# Patient Record
Sex: FEMALE | Race: WHITE | NOT HISPANIC OR LATINO | Employment: OTHER | URBAN - METROPOLITAN AREA
[De-identification: names, ages, dates, MRNs, and addresses within clinical notes are randomized per-mention and may not be internally consistent; named-entity substitution may affect disease eponyms.]

---

## 2017-02-16 ENCOUNTER — HOSPITAL ENCOUNTER (INPATIENT)
Facility: HOSPITAL | Age: 82
LOS: 7 days | Discharge: NURSING, REHAB OR LONG-TERM CARE | End: 2017-02-23
Attending: EMERGENCY MEDICINE | Admitting: FAMILY MEDICINE

## 2017-02-16 ENCOUNTER — APPOINTMENT (OUTPATIENT)
Dept: GENERAL RADIOLOGY | Facility: HOSPITAL | Age: 82
End: 2017-02-16

## 2017-02-16 DIAGNOSIS — N39.0 URINARY TRACT INFECTION, SITE UNSPECIFIED: ICD-10-CM

## 2017-02-16 DIAGNOSIS — R65.20 SEVERE SEPSIS (HCC): Primary | ICD-10-CM

## 2017-02-16 DIAGNOSIS — L03.119 CELLULITIS OF LOWER EXTREMITY, UNSPECIFIED LATERALITY: ICD-10-CM

## 2017-02-16 DIAGNOSIS — A41.9 SEVERE SEPSIS (HCC): Primary | ICD-10-CM

## 2017-02-16 LAB
ALBUMIN SERPL-MCNC: 3.6 G/DL (ref 3.2–4.8)
ALBUMIN/GLOB SERPL: 1.1 G/DL (ref 1.5–2.5)
ALP SERPL-CCNC: 76 U/L (ref 25–100)
ALT SERPL W P-5'-P-CCNC: 10 U/L (ref 7–40)
ANION GAP SERPL CALCULATED.3IONS-SCNC: 8 MMOL/L (ref 3–11)
AST SERPL-CCNC: 17 U/L (ref 0–33)
BACTERIA UR QL AUTO: ABNORMAL /HPF
BASOPHILS # BLD AUTO: 0.01 10*3/MM3 (ref 0–0.2)
BASOPHILS NFR BLD AUTO: 0.1 % (ref 0–1)
BILIRUB SERPL-MCNC: 0.4 MG/DL (ref 0.3–1.2)
BILIRUB UR QL STRIP: NEGATIVE
BUN BLD-MCNC: 33 MG/DL (ref 9–23)
BUN/CREAT SERPL: 33 (ref 7–25)
C DIFF TOX GENS STL QL NAA+PROBE: DETECTED
CALCIUM SPEC-SCNC: 9.3 MG/DL (ref 8.7–10.4)
CHLORIDE SERPL-SCNC: 105 MMOL/L (ref 99–109)
CLARITY UR: ABNORMAL
CO2 SERPL-SCNC: 25 MMOL/L (ref 20–31)
COLOR UR: ABNORMAL
CREAT BLD-MCNC: 1 MG/DL (ref 0.6–1.3)
D-LACTATE SERPL-SCNC: 1.4 MMOL/L (ref 0.5–2)
D-LACTATE SERPL-SCNC: 3.2 MMOL/L (ref 0.5–2)
D-LACTATE SERPL-SCNC: 4.2 MMOL/L (ref 0.5–2)
D-LACTATE SERPL-SCNC: 4.6 MMOL/L (ref 0.5–2)
DEPRECATED RDW RBC AUTO: 51.9 FL (ref 37–54)
EOSINOPHIL # BLD AUTO: 0.2 10*3/MM3 (ref 0.1–0.3)
EOSINOPHIL NFR BLD AUTO: 1.5 % (ref 0–3)
ERYTHROCYTE [DISTWIDTH] IN BLOOD BY AUTOMATED COUNT: 15.1 % (ref 11.3–14.5)
GFR SERPL CREATININE-BSD FRML MDRD: 52 ML/MIN/1.73
GLOBULIN UR ELPH-MCNC: 3.4 GM/DL
GLUCOSE BLD-MCNC: 101 MG/DL (ref 70–100)
GLUCOSE BLDC GLUCOMTR-MCNC: 90 MG/DL (ref 70–130)
GLUCOSE UR STRIP-MCNC: NEGATIVE MG/DL
HCT VFR BLD AUTO: 30.7 % (ref 34.5–44)
HGB BLD-MCNC: 9.7 G/DL (ref 11.5–15.5)
HGB UR QL STRIP.AUTO: ABNORMAL
HYALINE CASTS UR QL AUTO: ABNORMAL /LPF
IMM GRANULOCYTES # BLD: 0.09 10*3/MM3 (ref 0–0.03)
IMM GRANULOCYTES NFR BLD: 0.7 % (ref 0–0.6)
KETONES UR QL STRIP: NEGATIVE
LEUKOCYTE ESTERASE UR QL STRIP.AUTO: ABNORMAL
LYMPHOCYTES # BLD AUTO: 0.17 10*3/MM3 (ref 0.6–4.8)
LYMPHOCYTES NFR BLD AUTO: 1.2 % (ref 24–44)
MCH RBC QN AUTO: 29.7 PG (ref 27–31)
MCHC RBC AUTO-ENTMCNC: 31.6 G/DL (ref 32–36)
MCV RBC AUTO: 93.9 FL (ref 80–99)
MONOCYTES # BLD AUTO: 0.64 10*3/MM3 (ref 0–1)
MONOCYTES NFR BLD AUTO: 4.7 % (ref 0–12)
NEUTROPHILS # BLD AUTO: 12.55 10*3/MM3 (ref 1.5–8.3)
NEUTROPHILS NFR BLD AUTO: 91.8 % (ref 41–71)
NITRITE UR QL STRIP: POSITIVE
PH UR STRIP.AUTO: 8.5 [PH] (ref 5–8)
PLAT MORPH BLD: NORMAL
PLATELET # BLD AUTO: 387 10*3/MM3 (ref 150–450)
PMV BLD AUTO: 10.6 FL (ref 6–12)
POTASSIUM BLD-SCNC: 4.1 MMOL/L (ref 3.5–5.5)
PROT SERPL-MCNC: 7 G/DL (ref 5.7–8.2)
PROT UR QL STRIP: ABNORMAL
RBC # BLD AUTO: 3.27 10*6/MM3 (ref 3.89–5.14)
RBC # UR: ABNORMAL /HPF
REF LAB TEST METHOD: ABNORMAL
SODIUM BLD-SCNC: 138 MMOL/L (ref 132–146)
SP GR UR STRIP: 1.02 (ref 1–1.03)
SQUAMOUS #/AREA URNS HPF: ABNORMAL /HPF
STOMATOCYTES BLD QL SMEAR: NORMAL
UROBILINOGEN UR QL STRIP: ABNORMAL
WBC MORPH BLD: NORMAL
WBC NRBC COR # BLD: 13.66 10*3/MM3 (ref 3.5–10.8)
WBC UR QL AUTO: ABNORMAL /HPF

## 2017-02-16 PROCEDURE — 83605 ASSAY OF LACTIC ACID: CPT | Performed by: FAMILY MEDICINE

## 2017-02-16 PROCEDURE — 80053 COMPREHEN METABOLIC PANEL: CPT | Performed by: EMERGENCY MEDICINE

## 2017-02-16 PROCEDURE — 99285 EMERGENCY DEPT VISIT HI MDM: CPT

## 2017-02-16 PROCEDURE — 94799 UNLISTED PULMONARY SVC/PX: CPT

## 2017-02-16 PROCEDURE — 87147 CULTURE TYPE IMMUNOLOGIC: CPT | Performed by: EMERGENCY MEDICINE

## 2017-02-16 PROCEDURE — 87086 URINE CULTURE/COLONY COUNT: CPT | Performed by: EMERGENCY MEDICINE

## 2017-02-16 PROCEDURE — 87493 C DIFF AMPLIFIED PROBE: CPT | Performed by: FAMILY MEDICINE

## 2017-02-16 PROCEDURE — 83605 ASSAY OF LACTIC ACID: CPT | Performed by: EMERGENCY MEDICINE

## 2017-02-16 PROCEDURE — 85025 COMPLETE CBC W/AUTO DIFF WBC: CPT | Performed by: EMERGENCY MEDICINE

## 2017-02-16 PROCEDURE — 71010 HC CHEST PA OR AP: CPT

## 2017-02-16 PROCEDURE — 87150 DNA/RNA AMPLIFIED PROBE: CPT | Performed by: EMERGENCY MEDICINE

## 2017-02-16 PROCEDURE — 94640 AIRWAY INHALATION TREATMENT: CPT

## 2017-02-16 PROCEDURE — 85007 BL SMEAR W/DIFF WBC COUNT: CPT | Performed by: EMERGENCY MEDICINE

## 2017-02-16 PROCEDURE — 87077 CULTURE AEROBIC IDENTIFY: CPT | Performed by: EMERGENCY MEDICINE

## 2017-02-16 PROCEDURE — 25010000002 VANCOMYCIN PER 500 MG: Performed by: EMERGENCY MEDICINE

## 2017-02-16 PROCEDURE — 81001 URINALYSIS AUTO W/SCOPE: CPT | Performed by: EMERGENCY MEDICINE

## 2017-02-16 PROCEDURE — 87186 SC STD MICRODIL/AGAR DIL: CPT | Performed by: EMERGENCY MEDICINE

## 2017-02-16 PROCEDURE — 94760 N-INVAS EAR/PLS OXIMETRY 1: CPT

## 2017-02-16 PROCEDURE — 25010000002 PIPERACILLIN SOD-TAZOBACTAM PER 1 G: Performed by: EMERGENCY MEDICINE

## 2017-02-16 PROCEDURE — 87040 BLOOD CULTURE FOR BACTERIA: CPT | Performed by: EMERGENCY MEDICINE

## 2017-02-16 PROCEDURE — 25010000002 PIPERACILLIN SOD-TAZOBACTAM PER 1 G: Performed by: FAMILY MEDICINE

## 2017-02-16 PROCEDURE — 99223 1ST HOSP IP/OBS HIGH 75: CPT | Performed by: FAMILY MEDICINE

## 2017-02-16 PROCEDURE — 82962 GLUCOSE BLOOD TEST: CPT

## 2017-02-16 RX ORDER — SULFAMETHOXAZOLE AND TRIMETHOPRIM 800; 160 MG/1; MG/1
1 TABLET ORAL 2 TIMES DAILY
COMMUNITY
Start: 2017-02-14 | End: 2017-02-23 | Stop reason: HOSPADM

## 2017-02-16 RX ORDER — TRAMADOL HYDROCHLORIDE 50 MG/1
50 TABLET ORAL EVERY 8 HOURS PRN
Status: DISCONTINUED | OUTPATIENT
Start: 2017-02-16 | End: 2017-02-23 | Stop reason: HOSPADM

## 2017-02-16 RX ORDER — IPRATROPIUM BROMIDE AND ALBUTEROL SULFATE 2.5; .5 MG/3ML; MG/3ML
3 SOLUTION RESPIRATORY (INHALATION)
Status: DISCONTINUED | OUTPATIENT
Start: 2017-02-16 | End: 2017-02-22

## 2017-02-16 RX ORDER — ONDANSETRON 2 MG/ML
4 INJECTION INTRAMUSCULAR; INTRAVENOUS EVERY 6 HOURS PRN
Status: DISCONTINUED | OUTPATIENT
Start: 2017-02-16 | End: 2017-02-23 | Stop reason: HOSPADM

## 2017-02-16 RX ORDER — VANCOMYCIN HYDROCHLORIDE 1 G/200ML
20 INJECTION, SOLUTION INTRAVENOUS ONCE
Status: COMPLETED | OUTPATIENT
Start: 2017-02-16 | End: 2017-02-16

## 2017-02-16 RX ORDER — SODIUM CHLORIDE 0.9 % (FLUSH) 0.9 %
1-10 SYRINGE (ML) INJECTION AS NEEDED
Status: DISCONTINUED | OUTPATIENT
Start: 2017-02-16 | End: 2017-02-23 | Stop reason: HOSPADM

## 2017-02-16 RX ORDER — ACETAMINOPHEN 650 MG/1
650 SUPPOSITORY RECTAL EVERY 4 HOURS PRN
Status: DISCONTINUED | OUTPATIENT
Start: 2017-02-16 | End: 2017-02-23 | Stop reason: HOSPADM

## 2017-02-16 RX ORDER — DORZOLAMIDE HYDROCHLORIDE AND TIMOLOL MALEATE 20; 5 MG/ML; MG/ML
1 SOLUTION/ DROPS OPHTHALMIC 2 TIMES DAILY
Status: DISCONTINUED | OUTPATIENT
Start: 2017-02-16 | End: 2017-02-23 | Stop reason: HOSPADM

## 2017-02-16 RX ORDER — ACETAMINOPHEN 500 MG
500 TABLET ORAL DAILY
Status: DISCONTINUED | OUTPATIENT
Start: 2017-02-16 | End: 2017-02-23 | Stop reason: HOSPADM

## 2017-02-16 RX ORDER — NYSTATIN 100000 [USP'U]/G
POWDER TOPICAL EVERY 12 HOURS SCHEDULED
Status: DISCONTINUED | OUTPATIENT
Start: 2017-02-16 | End: 2017-02-23 | Stop reason: HOSPADM

## 2017-02-16 RX ORDER — BISACODYL 10 MG
10 SUPPOSITORY, RECTAL RECTAL DAILY
Status: DISCONTINUED | OUTPATIENT
Start: 2017-02-16 | End: 2017-02-23 | Stop reason: HOSPADM

## 2017-02-16 RX ORDER — DOXYCYCLINE HYCLATE 100 MG/1
100 CAPSULE ORAL EVERY 12 HOURS SCHEDULED
Status: DISCONTINUED | OUTPATIENT
Start: 2017-02-16 | End: 2017-02-21

## 2017-02-16 RX ORDER — SODIUM CHLORIDE 9 MG/ML
125 INJECTION, SOLUTION INTRAVENOUS CONTINUOUS
Status: DISCONTINUED | OUTPATIENT
Start: 2017-02-16 | End: 2017-02-19

## 2017-02-16 RX ORDER — BISACODYL 10 MG
10 SUPPOSITORY, RECTAL RECTAL DAILY
COMMUNITY

## 2017-02-16 RX ORDER — SODIUM CHLORIDE 9 MG/ML
250 INJECTION, SOLUTION INTRAVENOUS CONTINUOUS
Status: DISCONTINUED | OUTPATIENT
Start: 2017-02-16 | End: 2017-02-16

## 2017-02-16 RX ORDER — MAGNESIUM HYDROXIDE/ALUMINUM HYDROXICE/SIMETHICONE 120; 1200; 1200 MG/30ML; MG/30ML; MG/30ML
30 SUSPENSION ORAL EVERY 6 HOURS PRN
Status: DISCONTINUED | OUTPATIENT
Start: 2017-02-16 | End: 2017-02-23 | Stop reason: HOSPADM

## 2017-02-16 RX ADMIN — TAZOBACTAM SODIUM AND PIPERACILLIN SODIUM 3.38 G: 375; 3 INJECTION, SOLUTION INTRAVENOUS at 08:24

## 2017-02-16 RX ADMIN — SODIUM CHLORIDE 125 ML/HR: 9 INJECTION, SOLUTION INTRAVENOUS at 14:52

## 2017-02-16 RX ADMIN — DORZOLAMIDE HYDROCHLORIDE AND TIMOLOL MALEATE 1 DROP: 20; 5 SOLUTION/ DROPS OPHTHALMIC at 20:12

## 2017-02-16 RX ADMIN — TAZOBACTAM SODIUM AND PIPERACILLIN SODIUM 3.38 G: 375; 3 INJECTION, SOLUTION INTRAVENOUS at 14:52

## 2017-02-16 RX ADMIN — TAZOBACTAM SODIUM AND PIPERACILLIN SODIUM 3.38 G: 375; 3 INJECTION, SOLUTION INTRAVENOUS at 20:13

## 2017-02-16 RX ADMIN — APIXABAN 2.5 MG: 2.5 TABLET, FILM COATED ORAL at 14:51

## 2017-02-16 RX ADMIN — NYSTATIN: 100000 POWDER TOPICAL at 20:14

## 2017-02-16 RX ADMIN — NYSTATIN: 100000 POWDER TOPICAL at 14:51

## 2017-02-16 RX ADMIN — APIXABAN 2.5 MG: 2.5 TABLET, FILM COATED ORAL at 20:12

## 2017-02-16 RX ADMIN — VANCOMYCIN HYDROCHLORIDE 1000 MG: 1 INJECTION, SOLUTION INTRAVENOUS at 09:02

## 2017-02-16 RX ADMIN — SODIUM CHLORIDE 500 ML: 9 INJECTION, SOLUTION INTRAVENOUS at 09:05

## 2017-02-16 RX ADMIN — TRAMADOL HYDROCHLORIDE 50 MG: 50 TABLET, COATED ORAL at 10:57

## 2017-02-16 RX ADMIN — IPRATROPIUM BROMIDE AND ALBUTEROL SULFATE 3 ML: .5; 3 SOLUTION RESPIRATORY (INHALATION) at 19:47

## 2017-02-16 RX ADMIN — DOXYCYCLINE HYCLATE 100 MG: 100 CAPSULE, GELATIN COATED ORAL at 20:12

## 2017-02-16 RX ADMIN — IPRATROPIUM BROMIDE AND ALBUTEROL SULFATE 3 ML: .5; 3 SOLUTION RESPIRATORY (INHALATION) at 15:45

## 2017-02-16 RX ADMIN — SODIUM CHLORIDE 1500 ML: 9 INJECTION, SOLUTION INTRAVENOUS at 10:18

## 2017-02-16 RX ADMIN — ACETAMINOPHEN 500 MG: 500 TABLET ORAL at 14:51

## 2017-02-16 RX ADMIN — SODIUM CHLORIDE 250 ML/HR: 9 INJECTION, SOLUTION INTRAVENOUS at 09:21

## 2017-02-16 NOTE — CONSULTS
Raghavendra Paz MD  Consulting physician: Gabino    Chief Complaint   Patient presents with   • Fever         HPI: Patient has a history of dementia and is unable to provide any type of history so history of present illness comes from chart review.  Patient apparently is a resident of a nursing facility in which she is mostly bed bound.  Patient has probably had multiple infections and been treated with multiple antibiotics over the last month to couple of months.  Patient comes in hospital for fever has been found to be septic at this point in time.      Past Medical History   Diagnosis Date   • Anemia    • Arthritis    • Chronic kidney disease (CKD), stage III (moderate)    • Chronic pain      right hip   • Confusion    • Dehydration    • Dementia    • Glaucoma    • H/O urinary frequency    • Hypertension    • Insomnia    • Macular degeneration    • PVD (peripheral vascular disease)      wears compression stockings   • Renal disorder    • Upper respiratory infection      Past Surgical History   Procedure Laterality Date   • Replacement total knee Left    • Bladder repair     • Hysterectomy       Current Facility-Administered Medications   Medication Dose Route Frequency Provider Last Rate Last Dose   • acetaminophen (TYLENOL) suppository 650 mg  650 mg Rectal Q4H PRN Nj Lloyd MD       • acetaminophen (TYLENOL) tablet 500 mg  500 mg Oral Daily Nj Lloyd MD       • aluminum-magnesium hydroxide-simethicone (MAALOX/MYLANTA) suspension 30 mL  30 mL Oral Q6H PRN Nj Lloyd MD       • apixaban (ELIQUIS) tablet 2.5 mg  2.5 mg Oral Q12H Nj Lloyd MD       • bisacodyl (DULCOLAX) suppository 10 mg  10 mg Rectal Daily Nj Lloyd MD       • dorzolamide-timolol (COSOPT) ophthalmic solution 1 drop  1 drop Both Eyes BID Nj Lloyd MD       • ipratropium-albuterol (DUO-NEB) nebulizer solution 3 mL  3 mL Nebulization 4x Daily - RT Nj Lloyd MD       • magnesium hydroxide (MILK OF MAGNESIA)  "suspension 2400 mg/10mL 10 mL  10 mL Oral Daily PRN Nj Lloyd MD       • nystatin (MYCOSTATIN) powder   Topical Q12H Nj Lloyd MD       • ondansetron (ZOFRAN) injection 4 mg  4 mg Intravenous Q6H PRN Nj Lloyd MD       • Pharmacy to dose vancomycin   Does not apply Continuous PRN Nj Lloyd MD       • piperacillin-tazobactam (ZOSYN) 3.375 g in dextrose 50 mL IVPB (premix)  3.375 g Intravenous Q6H Nj Lloyd MD       • sodium chloride 0.9 % flush 1-10 mL  1-10 mL Intravenous PRN Nj Lloyd MD       • sodium chloride 0.9 % infusion  125 mL/hr Intravenous Continuous Nj Lloyd MD       • traMADol (ULTRAM) tablet 50 mg  50 mg Oral Q8H PRN Nj Lloyd MD   50 mg at 02/16/17 1057   • [START ON 2/17/2017] vancomycin (dosing per levels)   Does not apply Daily Fady Comer, [facility]           Pharmacy to dose vancomycin    sodium chloride 125 mL/hr     •  acetaminophen  •  aluminum-magnesium hydroxide-simethicone  •  magnesium hydroxide  •  ondansetron  •  Pharmacy to dose vancomycin  •  sodium chloride  •  traMADol  No Known Allergies  Family History   Problem Relation Age of Onset   • Stroke Father      Social History     Social History   • Marital status: Unknown     Spouse name: N/A   • Number of children: N/A   • Years of education: N/A     Occupational History   • Not on file.     Social History Main Topics   • Smoking status: Never Smoker   • Smokeless tobacco: Not on file   • Alcohol use No   • Drug use: No   • Sexual activity: Defer     Other Topics Concern   • Not on file     Social History Narrative   • No narrative on file     Review of Systems - unable to obtain secondary to patient been unable answer questions      Visit Vitals   • /85   • Pulse (P) 81   • Temp 99.8 °F (37.7 °C) (Rectal)   • Resp 18   • Ht 62\" (157.5 cm)   • Wt 110 lb (49.9 kg)   • SpO2 92%   • BMI 20.12 kg/m2       Intake/Output Summary (Last 24 hours) at 02/16/17 1408  Last data filed at 02/16/17 0759   " Gross per 24 hour   Intake      0 ml   Output    400 ml   Net   -400 ml     Physical Exam:      General Appearance:    asleep, arousable, agitated appearing when aroused    Head:    Normocephalic, without obvious abnormality, atraumatic   Eyes:            Lids and lashes normal, conjunctivae and sclerae normal, no   icterus, no pallor, corneas clear, PERRLA   Ears:    Ears appear intact with no abnormalities noted   Throat:   No oral lesions, no thrush, oral mucosa moist   Neck:   No adenopathy, supple, trachea midline, no thyromegaly, no     carotid bruit, no JVD   Back:     No kyphosis present, no scoliosis present, no skin lesions,       erythema or scars, no tenderness to percussion or                   palpation,   range of motion normal   Lungs:     Clear to auscultation,respirations regular, even and                   unlabored    Heart:    Regular rhythm and normal rate, normal S1 and S2, no            murmur, no gallop, no rub, no click   Breast Exam:    Deferred   Abdomen:     Normal bowel sounds, no masses, no organomegaly, soft        non-tender, non-distended, no guarding, no rebound                 tenderness   Genitalia:    Deferred   Extremities:   left lower extremity with an area of erythema and warmth along the lower portion of her calf area going anteriorly    Pulses:   Pulses palpable and equal bilaterally   Skin:   patient noted to have heel ulcers unstageable on the right heel    Lymph nodes:   No palpable adenopathy             Results from last 7 days  Lab Units 02/16/17  0812   WBC 10*3/mm3 13.66*   HEMOGLOBIN g/dL 9.7*   HEMATOCRIT % 30.7*   PLATELETS 10*3/mm3 387       Results from last 7 days  Lab Units 02/16/17  0812   SODIUM mmol/L 138   POTASSIUM mmol/L 4.1   CHLORIDE mmol/L 105   TOTAL CO2 mmol/L 25.0   BUN mg/dL 33*   CREATININE mg/dL 1.00   CALCIUM mg/dL 9.3   BILIRUBIN mg/dL 0.4   ALK PHOS U/L 76   ALT (SGPT) U/L 10   AST (SGOT) U/L 17   GLUCOSE mg/dL 101*       Results from last  7 days  Lab Units 02/16/17  0812   SODIUM mmol/L 138   POTASSIUM mmol/L 4.1   CHLORIDE mmol/L 105   TOTAL CO2 mmol/L 25.0   BUN mg/dL 33*   CREATININE mg/dL 1.00   GLUCOSE mg/dL 101*   CALCIUM mg/dL 9.3     Imaging Results (last 72 hours)     Procedure Component Value Units Date/Time    XR Chest 1 View [[id number]] Collected:  02/16/17 0924     Updated:  02/16/17 1024    Narrative:       EXAMINATION: XR CHEST 1 VW-      INDICATION: Fever.      COMPARISON: 12/29/2016.     FINDINGS: There is persistent airspace disease in the left lower lobe.  The heart is large but radiographically compensated.           Impression:       Persistent left lower lobe airspace disease.     D:  02/16/2017  E:  02/16/2017     This report was finalized on 2/16/2017 10:22 AM by Dr. Lars Washington MD.           Impression: Sepsis POA  UTI POA  Agitation  Goals of care  Plan: I did have discussion with the hospitalist who has talked to the patient's daughter.  CODE STATUS has been discussed at this point in time.  My plan is to touch base to the patient's daughter tomorrow and  overall goals of care conversations at that point time.  Patient deftly has a very poor overall prognosis due to her advanced age as well as her advanced dementia which is a progressive disease which ultimately is uncurable.  This coupled with the recent multiple infections again loan itself to a overall poor prognosis and recurrent hospitalizations with the inability to fix the underlying problem, again being the patient's dementia.        Víctor Stovall,   02/16/17  2:08 PM

## 2017-02-16 NOTE — PROGRESS NOTES
Pharmacy Consult--Antimicrobial Dosing    Pt is a 89 yo F admitted to the hospital for severe sepsis thought to be due to pneumonia.  Recent admission in December with pneumonia.  Pharmacy consulted to manage vancomycin for this indication.    Indication:  Pneumonia  Consulting Provider:  Dr. Lloyd    Current Antimicrobial Therapy  1.  Vancomycin PTD (Day 1)  2.  Zosyn 3.375g IV q6h (Day 1)    Previous Antimicrobials Received    Review of patient's allergies indicates no known allergies.    Labs    Results from last 7 days     Lab Units 02/16/17  0812   SODIUM mmol/L 138   POTASSIUM mmol/L 4.1   CHLORIDE mmol/L 105   TOTAL CO2 mmol/L 25.0   BUN mg/dL 33*   CREATININE mg/dL 1.00   CALCIUM mg/dL 9.3   BILIRUBIN mg/dL 0.4   ALK PHOS U/L 76   ALT (SGPT) U/L 10   AST (SGOT) U/L 17   GLUCOSE mg/dL 101*     Results from last 7 days     Lab Units 02/16/17  0812   WBC 10*3/mm3 13.66*   HEMOGLOBIN g/dL 9.7*   HEMATOCRIT % 30.7*   PLATELETS 10*3/mm3 387     Evaluation of Dosing  Weight:  49.9kg  Last Dose Received in the ED/Outside Facility:  1g IV x 1 received at 0902  Goal Trough:  15-20 mcg/mL for PNA    Estimated Creatinine Clearance: 27.7 mL/min (by C-G formula based on Cr of 1).    Microbiology and Radiology     Blood culture x 2 pending  Urine culture pending     Evaluation of Level    Vancomycin random level pending for tomorrow AM    Plan:  1.  Hold further vancomycin for now.  2.  Will check vancomycin random level in AM.  3.  Continue current dosing of Zosyn.  4.  Pharmacy will continue to follow and adjust dose based on renal function, drug levels, and clinical status.    Thanks,  Fady Comer, PharmD, BCPS  #[id number]  02/16/17  11:55 AM

## 2017-02-16 NOTE — PROGRESS NOTES
Discharge Planning Assessment  [facility]     Patient Name: Joana Dixon  MRN: [id number]  Today's Date: 2/16/2017    Admit Date: 2/16/2017          Discharge Needs Assessment       02/16/17 0956    Living Environment    Lives With facility resident    Living Arrangements residential facility    Provides Primary Care For no one    Primary Care Provided By other (see comments)    Quality Of Family Relationships unable to assess    Able to Return to Prior Living Arrangements yes    Discharge Needs Assessment    Concerns To Be Addressed no discharge needs identified    Readmission Within The Last 30 Days no previous admission in last 30 days    Outpatient/Agency/Support Group Needs skilled nursing facility (specify)    Community Agency Name(S) [facility]    Anticipated Changes Related to Illness other (see comments)    Equipment Currently Used at Home wheelchair    Equipment Needed After Discharge none    Discharge Facility/Level Of Care Needs nursing facility, skilled    Transportation Available ambulance    Discharge Disposition skilled nursing facility    Discharge Contact Information if Applicable Nona Lu, daughter, [phone]            Discharge Plan       02/16/17 0986    Case Management/Social Work Plan    Plan Back to [facility]    Patient/Family In Agreement With Plan unable to assess    Additional Comments Pt is from [facility], completely dependent with all activities in a wheelchair. She has advanced dementia and unable to converse with CM in ED; daughter not present and info taken from NH danita and ED RN, Naomi. PERLA spoke with nurse, Susan, on unit one at facility. PERLA also spoke with admissions person, Latha and she will call back with bed hold information        Discharge Placement     No information found                Demographic Summary     None            Functional Status       02/16/17 0943    Functional Status Current    Ambulation 4-->completely dependent    Transferring 4-->completely  dependent    Toileting 4-->completely dependent    Bathing 4-->completely dependent    Dressing 4-->completely dependent    Eating 4-->completely dependent    Communication 2-->difficulty understanding and speaking (not related to language barrier)    Swallowing (if score 2 or more for any item, consult Rehab Services) 2-->difficulty swallowing liquids/foods    Change in Functional Status Since Onset of Current Illness/Injury no    Functional Status Prior    Ambulation 4-->completely dependent    Transferring 4-->completely dependent    Toileting 4-->completely dependent    Bathing 4-->completely dependent    Dressing 4-->completely dependent    Eating 4-->completely dependent    Communication 2-->difficulty understanding and speaking (not related to language barrier)    Swallowing 2-->difficulty swallowing liquids/foods    IADL    Medications assistive person    Meal Preparation assistive person    Housekeeping assistive person    Laundry assistive person    Shopping assistive person    Oral Care assistive person    IADL Comments Pt resides at [facility] and is completely dependent    Activity Tolerance    Current Activity Limitations none    Usual Activity Tolerance moderate    Current Activity Tolerance fair    Cognitive/Perceptual/Developmental    Current Mental Status/Cognitive Functioning not oriented to person;not oriented to place;not oriented to time;remote memory is not intact    Recent Changes in Mental Status/Cognitive Functioning unable to assess    Developmental Stage (Eriksson's Stages of Development) Stage 8 (65 years-death/Late Adulthood) Integrity vs. Despair    Employment/Financial    Employment/Finance Comments [employer],             Psychosocial     None            Abuse/Neglect     None            Legal     None            Substance Abuse     None            Patient Forms     None          Caridad Thomson

## 2017-02-16 NOTE — H&P
[facility] Medicine Services  HISTORY AND PHYSICAL    Primary Care Physician: Raghavendra Paz MD    Subjective     Chief Complaint:    Fever/chills/lehargy  History of Present Illness:   Mrs. Dixon is a 89 year old female with a history of chronic right hip pain, hypertension and glaucoma who recently moved to [place] from [facility] in December was recently admitted in the hospital in December with pneumonia sepsis secondary to pneumonia as well as bilateral lower exudates cellulitis was discharged just on January 3 with Augmentin for further 5 days, thereafter required repeat antibiotic course Keflex for bilateral lower exudates cellulitis with improvement and started on Bactrim just last night secondary to progressively worsening lower extremity redness and warmth and slight pain along with fevers temperature maximum of 101 received Tylenol and sent to the hospital for further evaluation though found to be afebrile in the hospital, does have leukocytosis low blood pressure in 90s, lactic acidosis, status post blood cultures, antibiotic Zosyn and vancomycin, IV fluid bolus and now currently on IV fluid maintenance.  Patient is not a good historian probably given her dementia is almost bedridden at skilled nursing facility, reviewed, discussed with the daughter on the phone.  Patient is full CODE STATUS.  She only complains of right hip pain which according to the daughter is chronic and she takes Tylenol for the same.  per nursing home notes and DrHyun patient confused and has worsened      Review of Systems   Limited review of system as patient the mental condition is not such but she only complains about right hip pain feeling cold and wants to be left alone    Past Medical History:   Past Medical History   Diagnosis Date   • Anemia    • Arthritis    • Chronic kidney disease (CKD), stage III (moderate)    • Chronic pain      right hip   • Confusion    • Dehydration    • Dementia    •  Glaucoma    • H/O urinary frequency    • Hypertension    • Insomnia    • Macular degeneration    • PVD (peripheral vascular disease)      wears compression stockings   • Renal disorder    • Upper respiratory infection        Past Surgical History:  Past Surgical History   Procedure Laterality Date   • Replacement total knee Left    • Bladder repair     • Hysterectomy         Family History: family history includes Stroke in her father.    Social History:  reports that she has never smoked. She does not have any smokeless tobacco history on file. She reports that she does not drink alcohol or use illicit drugs.    Medications:  No current facility-administered medications on file prior to encounter.      Current Outpatient Prescriptions on File Prior to Encounter   Medication Sig Dispense Refill   • acetaminophen (TYLENOL) 500 MG tablet Take 500 mg by mouth Daily.     • acetaminophen (TYLENOL) 650 MG suppository Insert 650 mg into the rectum Every 4 (Four) Hours As Needed for mild pain (1-3).     • amLODIPine (NORVASC) 2.5 MG tablet Take 1 tablet by mouth Daily. 30 tablet 0   • apixaban (ELIQUIS) 5 MG tablet tablet Take 1 tablet by mouth Every 12 (Twelve) Hours. 60 tablet 0   • castor oil-balsam peru (VENELEX) ointment Apply 1 application topically Every 12 (Twelve) Hours. 30 g 0   • diclofenac (VOLTAREN) 1 % gel gel Apply 4 g topically Daily As Needed. TO HANDS      • dorzolamide-timolol (COSOPT) 22.3-6.8 MG/ML ophthalmic solution Administer 1 drop to both eyes 2 (Two) Times a Day.     • ipratropium-albuterol (DUO-NEB) 0.5-2.5 mg/mL nebulizer Take 3 mL by nebulization 4 (Four) Times a Day.     • nystatin (MYCOSTATIN) 154241 UNIT/GM powder Apply  topically Every 12 (Twelve) Hours. 15 g 0   • [DISCONTINUED] predniSONE (DELTASONE) 5 MG tablet Take 1 tablet by mouth Daily With Breakfast. 30 tablet 0   • [DISCONTINUED] amoxicillin-clavulanate (AUGMENTIN) 875-125 MG per tablet Take 1 tablet by mouth Every 12 (Twelve)  "Hours. Indications: Pneumonia 10 tablet 0   • [DISCONTINUED] guaiFENesin (MUCINEX) 600 MG 12 hr tablet Take 1,200 mg by mouth 2 (Two) Times a Day.     • [DISCONTINUED] oxyCODONE (ROXICODONE) 5 MG immediate release tablet Take 5 mg by mouth Every 12 (Twelve) Hours As Needed for moderate pain (4-6).     • [DISCONTINUED] probiotic (CULTURELLE) capsule capsule Take 1 capsule by mouth 2 (Two) Times a Day. 14 capsule 0       Allergies:  No Known Allergies      Objective     Physical Exam:  Vital Signs:   Visit Vitals   • /61   • Pulse 87   • Temp 99.8 °F (37.7 °C) (Rectal)   • Resp 18   • Ht 62\" (157.5 cm)   • Wt 110 lb (49.9 kg)   • SpO2 92%   • BMI 20.12 kg/m2     Physical Exam  Temp:  [99.2 °F (37.3 °C)-99.8 °F (37.7 °C)] 99.8 °F (37.7 °C)  Heart Rate:  [68-89] 87  Resp:  [18] 18  BP: (114-121)/(61-72) 118/61  Constitutional: Mild Apparent distress lethargic initially easily awake able as soon she woke up the complains of right hip pain   Eyes: PERRLA, sclerae anicteric, no conjunctival injection  Neck: supple, no thyromegaly, trachea midline  Respiratory: Clear to auscultation except left lower base reveals, nonlabored respirations   Cardiovascular: RRR, no murmurs, rubs, or gallops, palpable pedal pulses bilaterally  Gastrointestinal: Positive bowel sounds, soft, nontender, nondistended, moderate suprapubic tenderness though  Musculoskeletal: I lateral lower extremity findings indicative of cellulitis with warmth and erythema and tenderness please see pictures below, no clubbing or cyanosis to bilateral lower extremities  Psychiatric: oriented x 3, appropriate affect, cooperative  Neurologic: Strength symmetric in all extremities, Cranial Nerves grossly intact to confrontation   Right heel showed the unstageable ulcer covered with the granulation tissue  Picture of the wound/skin lesion taken with verbal consent from patient and is only being used for serial examination and charting purposes. "             Results Reviewed:    Results from last 7 days  Lab Units 02/16/17  0812   WBC 10*3/mm3 13.66*   HEMOGLOBIN g/dL 9.7*   PLATELETS 10*3/mm3 387       Results from last 7 days  Lab Units 02/16/17  0812   SODIUM mmol/L 138   POTASSIUM mmol/L 4.1   TOTAL CO2 mmol/L 25.0   CREATININE mg/dL 1.00   GLUCOSE mg/dL 101*   CALCIUM mg/dL 9.3   CXR--  Persistent left lower lobe airspace disease.  Results for JOANA ALDANA (MRN [id number]) as of 2/16/2017 10:18   Ref. Range 2/16/2017 08:12   Lactate, Venous Latest Ref Range: 0.5 - 2.0 mmol/L 4.2 (C)   Recent duplex venous lower from December  · Acute right lower extremity deep vein thrombosis noted in the posterior tibial.  · Patient refused the left leg due to extreme pain.      I have personally reviewed and interpreted available lab data, radiology studies and ECG obtained at time of admission.     Assessment / Plan     Assessment/Problem List:   Principal Problem:    Severe sepsis  Active Problems:    UTI (urinary tract infection)    HCAP (healthcare-associated pneumonia)    PVD (peripheral vascular disease)    Lactic acid acidosis    Cellulitis    Chronic pain    Hearing impaired      Discussion 89-year-old Mrs. Joana Aldana with the recurrent infection including left lower lobe pneumonia,,  urinary tract infection, bilateral lower exudates cellulitis with as many as 3 antibiotic courses over the period of last 1-1/2 months probably undergoing failure to thrive, poor by mouth intake currently, eye to mental status probable infectious encephalopathy, along with chronic right heel ulcer unstageable, coccygeal stage I ulcer not fully evaluated today secondary to stools in the area nurses will clean the same and will need reevaluation, chronic right hip pain, peripheral vascular disease, chronic condition of glaucoma, hypertension.  Patient did have lactic acidosis, hypertension, leukocytosis along with encephalopathy indicative of severe sepsis probably secondary to  bilateral lower exudates cellulitis has received initial Zosyn and vancomycin in the ER and per my discussion with  is full CODE STATUS for now but is willing to discuss with palliative care physician regarding goals of care, but want to continue current care to get her out of acute condition does understand poor long-term prognosis and serious condition.  Status post IV fluid bolus in the ER and is currently on maintenance IV fluids with improvement in hypotension I will continue low-dose maintenance fluid cautiously to avoid any fluid overload    Plan:  Zosyn and vancomycin, follow cultures, infectious disease consult  Sepsis protocol, serial lactic acid  Condition critical admit to telemetry monitor  Wound care consult for right heel ulcer and coccygeal pressure ulcer  Palliative care consultation   consultation  Continue Eliquis recent diagnosis of DVT  Given no echocardiographic evaluation in the previous admission and current severe sepsis we will order echocardiography to better assess systolic and diastolic function      DVT prophylaxis: On Eliquis  Code Status: Full CODE STATUS  Admission Status: Patient will be admitted to Patient will be admitted to INPATIENT status due to the need to condition, need for IV antibiotics fluid  reuscitation  multiple consultation which can only be reasonably provided in an hospital setting such as aggressive/expedited ancillary services and/or consultation services and the necessity for IV medications, close physician monitoring and/or the possible need for procedures.  In such, I feel patient’s risk for adverse outcomes and need for care warrant INPATIENT evaluation and predict the patient’s care encounter to likely last beyond 2 midnights.       Nj Lloyd MD 02/16/17 10:53 AM

## 2017-02-16 NOTE — CONSULTS
Joana Dixon  9/24/1923  [id number]  2/16/2017      Referring Provider: Dr. Lloyd, [facility] medicine  Reason for Consultation: Severe sepsis.      Subjective   History of present illness: Severely debilitated 89-year-old  female.  Advanced Alzheimer's type dementia.  Nursing home resident at [facility].  Brought to the emergency department with altered mental status, hypotension, malodorous urine, and bilateral lower extremity cellulitis.  Peripheral leukocyte count 13,700.  Urine sediment with TNTC WBCs and 4+ bacteriuria.  Chest x-ray with left lower lobe pulmonary infiltrate, cardiomegaly, marked aortic calcification, and a left pleural effusion.  Started on intravenous vancomycin and piperacillin-tazobactam.  Serum lactate level 4.6.  Status post volume resuscitation at 30 mL/kg as per surviving sepsis guidelines.  Pre-existing DNR status.  Asked to assist with antimicrobial therapy and diagnostic evaluation in this context.    Past Medical History   Diagnosis Date   • Anemia    • Arthritis    • Chronic kidney disease (CKD), stage III (moderate)    • Chronic pain      right hip   • Confusion    • Dehydration    • Dementia    • Glaucoma    • H/O urinary frequency    • Hypertension    • Insomnia    • Macular degeneration    • PVD (peripheral vascular disease)      wears compression stockings   • Renal disorder    • Upper respiratory infection        Past Surgical History   Procedure Laterality Date   • Replacement total knee Left    • Bladder repair     • Hysterectomy         Pediatric History   Patient Guardian Status   • Not on file.     Other Topics Concern   • Not on file     Social History Narrative   • No narrative on file       family history includes Stroke in her father.    No Known Allergies    Medication:  Antibiotics:  IV Anti-Infectives     Ordered     Dose/Rate Route Frequency Start Stop    02/16/17 1138  vancomycin (dosing per levels)     Ordering Provider:  Fady Comer,  "RPH     Does not apply Daily 02/17/17 0900      02/16/17 1036  piperacillin-tazobactam (ZOSYN) 3.375 g in dextrose 50 mL IVPB (premix)     Ordering Provider:  Nj Lloyd MD    3.375 g  over 0.5 Hours Intravenous Every 6 Hours 02/16/17 1400      02/16/17 1036  Pharmacy to dose vancomycin     Ordering Provider:  Nj Lloyd MD     Does not apply Continuous PRN 02/16/17 1036      02/16/17 0744  vancomycin (VANCOCIN) IVPB 1 g (premix) in Dextrose 5% 200 mL     Ordering Provider:  Travis Nielsen MD    20 mg/kg × 49.9 kg  over 60 Minutes Intravenous Once 02/16/17 0830 02/16/17 1002    02/16/17 0744  piperacillin-tazobactam (ZOSYN) 3.375 g in dextrose 50 mL IVPB (premix)     Ordering Provider:  Travis Nielsen MD    3.375 g  over 0.5 Hours Intravenous Once 02/16/17 0800 02/16/17 0900          Please refer to the medical record for a full medication list    Review of Systems  Pertinent items are noted in HPI, all other systems reviewed and negative    Objective     Physical Exam:   Vital Signs   Temp:  [99.2 °F (37.3 °C)-99.8 °F (37.7 °C)] 99.8 °F (37.7 °C)  Heart Rate:  [68-89] 81  Resp:  [18] 18  BP: (102-131)/(61-85) 102/85    Blood pressure 102/85, pulse 81, temperature 99.8 °F (37.7 °C), temperature source Rectal, resp. rate 18, height 62\" (157.5 cm), weight 110 lb (49.9 kg), SpO2 92 %.  GENERAL: Chronically ill-appearing.  Marked temporal wasting.  Severely hard of hearing.  Perseverates \"don't get me out of bed\".  HEENT: Oropharynx without thrush. Sinuses nontender. Dentition in good repair. No cervical adenopathy. No carotid bruits/ jugular venous distention.   EYES: PERRL. No conjunctival injection. No icterus. EOMI. Bilateral arcus senilis.  LYMPHATICS: No lymphadenopathy of the neck or axillary or inguinal regions.   HEART: 2/6 crescendo decrescendo murmur suggesting underlying aortic valve sclerosis.  No audible aortic insufficiency..   LUNGS: Clear to auscultation anteriorly. No percussion dullness. "   ABDOMEN: Soft, nontender, nondistended. No appreciable HSM.    SKIN: Warm and dry without cutaneous eruptions. No embolic stigmata.  Erythema on bilateral anterior tibial surfaces.  This extends from the ankle joint to the tibial shaft.  Fungal onychomycosis is noted.  Hypertrophic nails are present.  There is marked hypertrophic callus formation on bilateral feet with superficial desquamation.  PSYCHIATRIC: Grossly, cranial nerve function intact.  The patient is unfamiliar with her whereabouts and is disoriented to day, date, time, and .      Results Review:   I reviewed the patient's new clinical results.    Lab Results   Component Value Date    WBC 13.66 (H) 02/16/2017    HGB 9.7 (L) 02/16/2017    HCT 30.7 (L) 02/16/2017    MCV 93.9 02/16/2017     02/16/2017       Lab Results   Component Value Date    GLUCOSE 101 (H) 02/16/2017    BUN 33 (H) 02/16/2017    CREATININE 1.00 02/16/2017    EGFRIFNONA 52 (L) 02/16/2017    BCR 33.0 (H) 02/16/2017    CO2 25.0 02/16/2017    CALCIUM 9.3 02/16/2017    ALBUMIN 3.60 02/16/2017    LABIL2 1.1 (L) 02/16/2017    AST 17 02/16/2017    ALT 10 02/16/2017       Estimated Creatinine Clearance: 27.7 mL/min (by C-G formula based on Cr of 1).      Microbiology: Blood cultures and urine culture and sensitivity pending at the time of this dictation.      Radiology:  Imaging Results (last 72 hours)     Procedure Component Value Units Date/Time    XR Chest 1 View [[id number]] Collected:  02/16/17 0924     Updated:  02/16/17 1024    Narrative:       EXAMINATION: XR CHEST 1 VW-      INDICATION: Fever.      COMPARISON: 12/29/2016.     FINDINGS: There is persistent airspace disease in the left lower lobe.  The heart is large but radiographically compensated.           Impression:       Persistent left lower lobe airspace disease.     D:  02/16/2017  E:  02/16/2017     This report was finalized on 2/16/2017 10:22 AM by Dr. Lars Washington MD.              ASSESSMENT AND PLAN: Severe sepsis.  Hypotension.  Altered mental status.  Leukocytosis to 14,000.  Active urine sediment consistent with UTI.  Left lower lobe pneumonia.  Bilateral lower extremity cellulitis.  Baseline Alzheimer's type dementia.  Lactic acidosis.  The current temperature is 99.8 degrees.  The patient's blood pressure following fluid boluses/volume resuscitation is 102/85 mmHg.  Vancomycin and Zosyn have been started which will provide broad coverage against staph aureus, MRSA, enteric gram-negative rods including pseudomonas aeruginosa, and upper airway anaerobes.  I am reluctant to continue intravenous vancomycin in light of the patient's extreme age and baseline chronic kidney disease, stage III.  I have taken the liberty to discontinue vancomycin.  We will start oral doxycycline for both MRSA and atypical pulmonary pathogen coverage.  Chest x-ray is reviewed which reveals chronic reticulonodular changes in the left lung base, partial silhouetting of the left hemidiaphragm consistent with pleural effusion, cardiomegaly, and extensive calcification within the aorta.  I do not appreciate prominent vasculature in an upper lobe distribution that would suggest congestive heart failure.  The patient's DNR status is noted.  There are no family in the immediate vicinity at the time of this consultation note.  Prognosis is guarded.  High risk for septic shock, multiple organ system failure, and death.       I discussed the patients findings and my recommendations with patient    Thank you for this consult.  Our group would be pleased to follow this patient over the course of their hospitalization and assist with outpatient antimicrobial therapy, as indicated.     Edgardo Beatty MD  2/16/2017

## 2017-02-16 NOTE — ED PROVIDER NOTES
Subjective   HPI Comments: Ms. Joana Dixon is a 89 yo demented female who lives at [facility] who presents to the ED with c/o a low grade fever for the last 2 days. Her maximum temperature prior to arrival was 100.9 degrees per staff at [facility]. They also report redness and warmth in her lower extremities with an ulcerated wound on the right heel. Here in the, she denies any nausea, but reports chronic right hip pain. Her history is limited secondary to dementia and she is unable to provide much information relating to the reason for her current visit here in the ED.       Patient is a 89 y.o. female presenting with fever.   History provided by:  Patient and nursing home  History limited by:  Dementia  Fever   Max temp prior to arrival:  100.9  Temp source:  Unable to specify  Severity:  Mild  Onset quality:  Gradual  Duration:  3 days  Chronicity:  New  Associated symptoms: confusion (secondary to dementia) and rash    Associated symptoms: no nausea        Review of Systems   Unable to perform ROS: Dementia   Constitutional: Positive for fever.   Gastrointestinal: Negative for nausea.   Musculoskeletal: Positive for arthralgias (chronic right hip pain).   Skin: Positive for rash and wound (right heel).   Psychiatric/Behavioral: Positive for confusion (secondary to dementia).       Past Medical History   Diagnosis Date   • Anemia    • Arthritis    • Chronic kidney disease (CKD), stage III (moderate)    • Chronic pain      right hip   • Confusion    • Dehydration    • Dementia    • Glaucoma    • H/O urinary frequency    • Hypertension    • Insomnia    • Macular degeneration    • PVD (peripheral vascular disease)      wears compression stockings   • Renal disorder    • Upper respiratory infection        No Known Allergies    Past Surgical History   Procedure Laterality Date   • Replacement total knee Left    • Bladder repair     • Hysterectomy         Family History   Problem Relation Age  of Onset   • Stroke Father        Social History     Social History   • Marital status: Unknown     Spouse name: N/A   • Number of children: N/A   • Years of education: N/A     Social History Main Topics   • Smoking status: Never Smoker   • Smokeless tobacco: None   • Alcohol use No   • Drug use: No   • Sexual activity: Defer     Other Topics Concern   • None     Social History Narrative   • None         Objective   Physical Exam   HENT:   Head: Normocephalic and atraumatic.   Airway patent. There is a lot of sticky saliva in the mouth along with poor dentition.   Eyes: Conjunctivae are normal. No scleral icterus.   Neck: Normal range of motion. Neck supple. No JVD present.   Cardiovascular: Normal rate and normal heart sounds.  An irregular rhythm present.   Pulmonary/Chest: Effort normal and breath sounds normal. No respiratory distress. She has no wheezes. She has no rales.   Abdominal: Soft. She exhibits no mass. There is tenderness (mild and diffuse tenderness to palpation). There is no guarding.   Musculoskeletal: She exhibits tenderness.   Both legs are red and hot to the touch. The lower extremities are tender to palpation.    Lymphadenopathy:     She has no cervical adenopathy.   Neurological: She is alert. She is disoriented.   Skin: Lesion (shallow ulcerated lesion on the back of her right heel) noted. She is not diaphoretic. There is erythema (lower extremities).   Nursing note and vitals reviewed.      Procedures         ED Course  ED Course   Comment By Time   Dr. Nielsen spoke with the patient's daughter. She says that the patient got cellulitis in her legs 2 weeks ago and was placed on Kelfex and got better. The cellulitis came back, so she was placed on Bactrim. She also states that her mother is more agitated than usual.  Stefan Navarro 02/16 8242   Her lactate just returned and is high enough to qualify for severe sepsis, no shock by BP.  Will initiate 30 mls/kg NS. Travis Nielsen MD 02/16 4045    Dr. Nielsen discussed case with Dr. Lloyd, [facility] medicine, who will admit.  Stefan Navarro 02/16 0919     Recent Results (from the past 24 hour(s))   POC Glucose Fingerstick    Collection Time: 02/16/17  7:16 AM   Result Value Ref Range    Glucose 90 70 - 130 mg/dL   Urinalysis With / Culture If Indicated    Collection Time: 02/16/17  7:58 AM   Result Value Ref Range    Color, UA Dark Yellow (A) Yellow, Straw    Appearance, UA Turbid (A) Clear    pH, UA 8.5 (H) 5.0 - 8.0    Specific Gravity, UA 1.020 1.001 - 1.030    Glucose, UA Negative Negative    Ketones, UA Negative Negative    Bilirubin, UA Negative Negative    Blood, UA Small (1+) (A) Negative    Protein, UA 30 mg/dL (1+) (A) Negative    Leuk Esterase, UA Large (3+) (A) Negative    Nitrite, UA Positive (A) Negative    Urobilinogen, UA 0.2 E.U./dL 0.2 - 1.0 E.U./dL   Urinalysis, Microscopic Only    Collection Time: 02/16/17  7:58 AM   Result Value Ref Range    RBC, UA 0-2 None Seen, 0-2 /HPF    WBC, UA Too Numerous to Count (A) None Seen /HPF    Bacteria, UA 4+ (A) None Seen, Trace /HPF    Squamous Epithelial Cells, UA 0-2 None Seen, 0-2 /HPF    Hyaline Casts, UA 0-6 0 - 6 /LPF    Methodology Automated Microscopy    Comprehensive Metabolic Panel    Collection Time: 02/16/17  8:12 AM   Result Value Ref Range    Glucose 101 (H) 70 - 100 mg/dL    BUN 33 (H) 9 - 23 mg/dL    Creatinine 1.00 0.60 - 1.30 mg/dL    Sodium 138 132 - 146 mmol/L    Potassium 4.1 3.5 - 5.5 mmol/L    Chloride 105 99 - 109 mmol/L    CO2 25.0 20.0 - 31.0 mmol/L    Calcium 9.3 8.7 - 10.4 mg/dL    Total Protein 7.0 5.7 - 8.2 g/dL    Albumin 3.60 3.20 - 4.80 g/dL    ALT (SGPT) 10 7 - 40 U/L    AST (SGOT) 17 0 - 33 U/L    Alkaline Phosphatase 76 25 - 100 U/L    Total Bilirubin 0.4 0.3 - 1.2 mg/dL    eGFR Non African Amer 52 (L) >60 mL/min/1.73    Globulin 3.4 gm/dL    A/G Ratio 1.1 (L) 1.5 - 2.5 g/dL    BUN/Creatinine Ratio 33.0 (H) 7.0 - 25.0    Anion Gap 8.0 3.0 - 11.0 mmol/L   Lactic Acid,  Plasma    Collection Time: 02/16/17  8:12 AM   Result Value Ref Range    Lactate 4.2 (C) 0.5 - 2.0 mmol/L   CBC Auto Differential    Collection Time: 02/16/17  8:12 AM   Result Value Ref Range    WBC 13.66 (H) 3.50 - 10.80 10*3/mm3    RBC 3.27 (L) 3.89 - 5.14 10*6/mm3    Hemoglobin 9.7 (L) 11.5 - 15.5 g/dL    Hematocrit 30.7 (L) 34.5 - 44.0 %    MCV 93.9 80.0 - 99.0 fL    MCH 29.7 27.0 - 31.0 pg    MCHC 31.6 (L) 32.0 - 36.0 g/dL    RDW 15.1 (H) 11.3 - 14.5 %    RDW-SD 51.9 37.0 - 54.0 fl    MPV 10.6 6.0 - 12.0 fL    Platelets 387 150 - 450 10*3/mm3    Neutrophil % 91.8 (H) 41.0 - 71.0 %    Lymphocyte % 1.2 (L) 24.0 - 44.0 %    Monocyte % 4.7 0.0 - 12.0 %    Eosinophil % 1.5 0.0 - 3.0 %    Basophil % 0.1 0.0 - 1.0 %    Immature Grans % 0.7 (H) 0.0 - 0.6 %    Neutrophils, Absolute 12.55 (H) 1.50 - 8.30 10*3/mm3    Lymphocytes, Absolute 0.17 (L) 0.60 - 4.80 10*3/mm3    Monocytes, Absolute 0.64 0.00 - 1.00 10*3/mm3    Eosinophils, Absolute 0.20 0.10 - 0.30 10*3/mm3    Basophils, Absolute 0.01 0.00 - 0.20 10*3/mm3    Immature Grans, Absolute 0.09 (H) 0.00 - 0.03 10*3/mm3   Scan Slide    Collection Time: 02/16/17  8:12 AM   Result Value Ref Range    Stomatocytes Slight/1+ None Seen    WBC Morphology Normal Normal    Platelet Morphology Normal Normal     Note: In addition to lab results from this visit, the labs listed above may include labs taken at another facility or during a different encounter within the last 24 hours. Please correlate lab times with ED admission and discharge times for further clarification of the services performed during this visit.    XR Chest 1 View   Preliminary Result   Persistent left lower lobe airspace disease.       D:  02/16/2017   E:  02/16/2017                Vitals:    02/16/17 0722 02/16/17 0759 02/16/17 0823 02/16/17 0923   BP:   114/61 121/72   Patient Position:       Pulse:   89 68   Resp:       Temp:  99.8 °F (37.7 °C)     TempSrc:  Rectal     SpO2: 95%  96% 98%   Weight:        Height:         Medications   vancomycin (VANCOCIN) IVPB 1 g (premix) in Dextrose 5% 200 mL (1,000 mg Intravenous New Bag 2/16/17 0902)   sodium chloride 0.9 % bolus 1,500 mL (not administered)   sodium chloride 0.9 % infusion (not administered)   piperacillin-tazobactam (ZOSYN) 3.375 g in dextrose 50 mL IVPB (premix) (0 g Intravenous Stopped 2/16/17 0900)   sodium chloride 0.9 % bolus 500 mL (500 mL Intravenous New Bag 2/16/17 0905)               MDM    Final diagnoses:   Severe sepsis   Urinary tract infection, site unspecified   Cellulitis of lower extremity, unspecified laterality       Documentation assistance provided by lazarus Navarro.  Information recorded by the scribe was done at my direction and has been verified and validated by me.     Stefan Navarro  02/16/17 0938       Travis Nielsen MD  02/16/17 9053

## 2017-02-16 NOTE — PLAN OF CARE
Problem: Patient Care Overview (Adult)  Goal: Plan of Care Review  Outcome: Ongoing (interventions implemented as appropriate)    02/16/17 1444   Coping/Psychosocial Response Interventions   Plan Of Care Reviewed With patient;other (see comments)  (RN )   Patient Care Overview   Progress no change   Outcome Evaluation   Outcome Summary/Follow up Plan Patient presents with right heel unstageable pressure injury POA. Wound presents with 100% slough, moist, and boggy. See LDA's for details. Applied Thera honey for autolytic debridement promotion. Cover with Xeroform and then secured with silicone border dressing. To be done daily per RN. Consulted PT wound care for mechanical debridement evaluation. Hopeful several days of honey will for aide mechanical debridement effectiveness. Ordered low air loss bed for patient. Patient currently on waffle mattress, heels elevated, dry flow pads in use, and turning. All pressure/skin interventions in place per RN. Will continue to follow at this time. Please contact WOCN if needs arise. Thanks         Problem: Pressure Ulcer (Adult)  Goal: Signs and Symptoms of Listed Potential Problems Will be Absent or Manageable (Pressure Ulcer)  Outcome: Ongoing (interventions implemented as appropriate)    02/16/17 1444   Pressure Ulcer   Problems Assessed (Pressure Ulcer) all   Problems Present (Pressure Ulcer) pain;wound progression/extension

## 2017-02-16 NOTE — NURSING NOTE
Will hold off on PT wound care consult for now. Palliative following at this time. Will follow up tomorrow for plan of care. Thanks

## 2017-02-16 NOTE — PLAN OF CARE
Problem: Patient Care Overview (Adult)  Goal: Plan of Care Review  Outcome: Ongoing (interventions implemented as appropriate)    02/16/17 1503   Coping/Psychosocial Response Interventions   Plan Of Care Reviewed With patient   Patient Care Overview   Progress unable to show any progress toward functional goals   Outcome Evaluation   Outcome Summary/Follow up Plan Pt transferred to floor from ER, pt confused as to where she is but knows name. She is very fearful but calms down when reoriented and talked to. WOC saw pt and changed right heel dressing. Will start daily dressing changes. Will continue to monitor overnight and follow further MD recs.        Goal: Adult Individualization and Mutuality  Outcome: Ongoing (interventions implemented as appropriate)  Goal: Discharge Needs Assessment  Outcome: Ongoing (interventions implemented as appropriate)    Problem: Sepsis (Adult)  Goal: Signs and Symptoms of Listed Potential Problems Will be Absent or Manageable (Sepsis)  Outcome: Ongoing (interventions implemented as appropriate)    Problem: Pressure Ulcer (Adult)  Goal: Signs and Symptoms of Listed Potential Problems Will be Absent or Manageable (Pressure Ulcer)  Outcome: Ongoing (interventions implemented as appropriate)    Problem: Skin Integrity Impairment, Risk/Actual (Adult)  Goal: Identify Related Risk Factors and Signs and Symptoms  Outcome: Ongoing (interventions implemented as appropriate)  Goal: Skin Integrity/Wound Healing  Outcome: Ongoing (interventions implemented as appropriate)

## 2017-02-17 ENCOUNTER — APPOINTMENT (OUTPATIENT)
Dept: CARDIOLOGY | Facility: HOSPITAL | Age: 82
End: 2017-02-17
Attending: FAMILY MEDICINE

## 2017-02-17 LAB
ANION GAP SERPL CALCULATED.3IONS-SCNC: 4 MMOL/L (ref 3–11)
BACTERIA BLD CULT: ABNORMAL
BASOPHILS # BLD AUTO: 0.01 10*3/MM3 (ref 0–0.2)
BASOPHILS NFR BLD AUTO: 0.1 % (ref 0–1)
BH CV ECHO MEAS - AI DEC SLOPE: 258.5 CM/SEC^2
BH CV ECHO MEAS - AI MAX PG: 58.8 MMHG
BH CV ECHO MEAS - AI MAX VEL: 383 CM/SEC
BH CV ECHO MEAS - AI P1/2T: 434 MSEC
BH CV ECHO MEAS - AO MAX PG (FULL): 5.1 MMHG
BH CV ECHO MEAS - AO MAX PG: 10 MMHG
BH CV ECHO MEAS - AO MEAN PG (FULL): 2 MMHG
BH CV ECHO MEAS - AO MEAN PG: 5 MMHG
BH CV ECHO MEAS - AO ROOT AREA (BSA CORRECTED): 1.9
BH CV ECHO MEAS - AO ROOT AREA: 7.5 CM^2
BH CV ECHO MEAS - AO ROOT DIAM: 3.1 CM
BH CV ECHO MEAS - AO V2 MAX: 158 CM/SEC
BH CV ECHO MEAS - AO V2 MEAN: 106 CM/SEC
BH CV ECHO MEAS - AO V2 VTI: 33.2 CM
BH CV ECHO MEAS - AVA(I,A): 1.9 CM^2
BH CV ECHO MEAS - AVA(I,D): 1.9 CM^2
BH CV ECHO MEAS - AVA(V,A): 1.9 CM^2
BH CV ECHO MEAS - AVA(V,D): 1.9 CM^2
BH CV ECHO MEAS - BSA(HAYCOCK): 1.7 M^2
BH CV ECHO MEAS - BSA: 1.7 M^2
BH CV ECHO MEAS - BZI_BMI: 26.7 KILOGRAMS/M^2
BH CV ECHO MEAS - BZI_METRIC_HEIGHT: 157.5 CM
BH CV ECHO MEAS - BZI_METRIC_WEIGHT: 66.2 KG
BH CV ECHO MEAS - CONTRAST EF (2CH): 66.1 ML/M^2
BH CV ECHO MEAS - CONTRAST EF 4CH: 65.2 ML/M^2
BH CV ECHO MEAS - EDV(CUBED): 42.9 ML
BH CV ECHO MEAS - EDV(MOD-SP2): 56 ML
BH CV ECHO MEAS - EDV(MOD-SP4): 69 ML
BH CV ECHO MEAS - EDV(TEICH): 50.9 ML
BH CV ECHO MEAS - EF(CUBED): 65.3 %
BH CV ECHO MEAS - EF(MOD-SP2): 66.1 %
BH CV ECHO MEAS - EF(MOD-SP4): 65.2 %
BH CV ECHO MEAS - EF(TEICH): 57.8 %
BH CV ECHO MEAS - ESV(CUBED): 14.9 ML
BH CV ECHO MEAS - ESV(MOD-SP2): 19 ML
BH CV ECHO MEAS - ESV(MOD-SP4): 24 ML
BH CV ECHO MEAS - ESV(TEICH): 21.4 ML
BH CV ECHO MEAS - FS: 29.7 %
BH CV ECHO MEAS - IVS/LVPW: 1.4
BH CV ECHO MEAS - IVSD: 1.4 CM
BH CV ECHO MEAS - LA DIMENSION: 3.3 CM
BH CV ECHO MEAS - LA/AO: 1.1
BH CV ECHO MEAS - LAT PEAK E' VEL: 12.4 CM/SEC
BH CV ECHO MEAS - LV DIASTOLIC VOL/BSA (35-75): 41.3 ML/M^2
BH CV ECHO MEAS - LV MASS(C)D: 141.1 GRAMS
BH CV ECHO MEAS - LV MASS(C)DI: 84.4 GRAMS/M^2
BH CV ECHO MEAS - LV MAX PG: 4.9 MMHG
BH CV ECHO MEAS - LV MEAN PG: 3 MMHG
BH CV ECHO MEAS - LV SYSTOLIC VOL/BSA (12-30): 14.4 ML/M^2
BH CV ECHO MEAS - LV V1 MAX: 111 CM/SEC
BH CV ECHO MEAS - LV V1 MEAN: 72.8 CM/SEC
BH CV ECHO MEAS - LV V1 VTI: 24.5 CM
BH CV ECHO MEAS - LVIDD: 3.5 CM
BH CV ECHO MEAS - LVIDS: 2.5 CM
BH CV ECHO MEAS - LVLD AP2: 6.5 CM
BH CV ECHO MEAS - LVLD AP4: 7.1 CM
BH CV ECHO MEAS - LVLS AP2: 5.2 CM
BH CV ECHO MEAS - LVLS AP4: 5.9 CM
BH CV ECHO MEAS - LVOT AREA (M): 2.5 CM^2
BH CV ECHO MEAS - LVOT AREA: 2.6 CM^2
BH CV ECHO MEAS - LVOT DIAM: 1.8 CM
BH CV ECHO MEAS - LVPWD: 1 CM
BH CV ECHO MEAS - MED PEAK E' VEL: 12.7 CM/SEC
BH CV ECHO MEAS - MV A MAX VEL: 107 CM/SEC
BH CV ECHO MEAS - MV DEC TIME: 0.24 SEC
BH CV ECHO MEAS - MV E MAX VEL: 88.3 CM/SEC
BH CV ECHO MEAS - MV E/A: 0.83
BH CV ECHO MEAS - PA ACC SLOPE: 403 CM/SEC^2
BH CV ECHO MEAS - PA ACC TIME: 0.16 SEC
BH CV ECHO MEAS - PA MAX PG: 1.5 MMHG
BH CV ECHO MEAS - PA PR(ACCEL): 8.8 MMHG
BH CV ECHO MEAS - PA V2 MAX: 62 CM/SEC
BH CV ECHO MEAS - RVDD: 2.7 CM
BH CV ECHO MEAS - SI(AO): 149.9 ML/M^2
BH CV ECHO MEAS - SI(CUBED): 16.7 ML/M^2
BH CV ECHO MEAS - SI(LVOT): 38.7 ML/M^2
BH CV ECHO MEAS - SI(MOD-SP2): 22.1 ML/M^2
BH CV ECHO MEAS - SI(MOD-SP4): 26.9 ML/M^2
BH CV ECHO MEAS - SI(TEICH): 17.6 ML/M^2
BH CV ECHO MEAS - SV(AO): 250.6 ML
BH CV ECHO MEAS - SV(CUBED): 28 ML
BH CV ECHO MEAS - SV(LVOT): 64.7 ML
BH CV ECHO MEAS - SV(MOD-SP2): 37 ML
BH CV ECHO MEAS - SV(MOD-SP4): 45 ML
BH CV ECHO MEAS - SV(TEICH): 29.4 ML
BH CV ECHO MEAS - TAPSE (>1.6): 1.8 CM2
BH CV ECHO MEAS - TR MAX VEL: 237.7 CM/SEC
BH CV XLRA - RV BASE: 3.8 CM
BH CV XLRA - RV LENGTH: 5.8 CM
BH CV XLRA - RV MID: 3.3 CM
BH CV XLRA - TDI S': 19 CM/SEC
BUN BLD-MCNC: 26 MG/DL (ref 9–23)
BUN/CREAT SERPL: 32.5 (ref 7–25)
CALCIUM SPEC-SCNC: 8 MG/DL (ref 8.7–10.4)
CHLORIDE SERPL-SCNC: 110 MMOL/L (ref 99–109)
CO2 SERPL-SCNC: 23 MMOL/L (ref 20–31)
CREAT BLD-MCNC: 0.8 MG/DL (ref 0.6–1.3)
D-LACTATE SERPL-SCNC: 1.1 MMOL/L (ref 0.5–2)
DEPRECATED RDW RBC AUTO: 53.7 FL (ref 37–54)
E/E' RATIO: 12.6
EOSINOPHIL # BLD AUTO: 0.71 10*3/MM3 (ref 0.1–0.3)
EOSINOPHIL NFR BLD AUTO: 10.2 % (ref 0–3)
ERYTHROCYTE [DISTWIDTH] IN BLOOD BY AUTOMATED COUNT: 15.4 % (ref 11.3–14.5)
GFR SERPL CREATININE-BSD FRML MDRD: 67 ML/MIN/1.73
GLUCOSE BLD-MCNC: 58 MG/DL (ref 70–100)
HCT VFR BLD AUTO: 27.7 % (ref 34.5–44)
HGB BLD-MCNC: 8.5 G/DL (ref 11.5–15.5)
IMM GRANULOCYTES # BLD: 0.04 10*3/MM3 (ref 0–0.03)
IMM GRANULOCYTES NFR BLD: 0.6 % (ref 0–0.6)
LEFT ATRIUM VOLUME INDEX: 35.3 ML/M2
LEFT ATRIUM VOLUME: 53 CM3
LV EF 2D ECHO EST: 65 %
LYMPHOCYTES # BLD AUTO: 0.61 10*3/MM3 (ref 0.6–4.8)
LYMPHOCYTES NFR BLD AUTO: 8.8 % (ref 24–44)
MAGNESIUM SERPL-MCNC: 1.8 MG/DL (ref 1.3–2.7)
MCH RBC QN AUTO: 28.9 PG (ref 27–31)
MCHC RBC AUTO-ENTMCNC: 30.7 G/DL (ref 32–36)
MCV RBC AUTO: 94.2 FL (ref 80–99)
MONOCYTES # BLD AUTO: 0.58 10*3/MM3 (ref 0–1)
MONOCYTES NFR BLD AUTO: 8.4 % (ref 0–12)
NEUTROPHILS # BLD AUTO: 4.98 10*3/MM3 (ref 1.5–8.3)
NEUTROPHILS NFR BLD AUTO: 71.9 % (ref 41–71)
PHOSPHATE SERPL-MCNC: 3.2 MG/DL (ref 2.4–5.1)
PLAT MORPH BLD: NORMAL
PLATELET # BLD AUTO: 323 10*3/MM3 (ref 150–450)
PMV BLD AUTO: 10.1 FL (ref 6–12)
POTASSIUM BLD-SCNC: 4.1 MMOL/L (ref 3.5–5.5)
RBC # BLD AUTO: 2.94 10*6/MM3 (ref 3.89–5.14)
RBC MORPH BLD: NORMAL
SODIUM BLD-SCNC: 137 MMOL/L (ref 132–146)
WBC MORPH BLD: NORMAL
WBC NRBC COR # BLD: 6.93 10*3/MM3 (ref 3.5–10.8)

## 2017-02-17 PROCEDURE — 94760 N-INVAS EAR/PLS OXIMETRY 1: CPT

## 2017-02-17 PROCEDURE — 25010000002 PIPERACILLIN SOD-TAZOBACTAM PER 1 G: Performed by: FAMILY MEDICINE

## 2017-02-17 PROCEDURE — 93306 TTE W/DOPPLER COMPLETE: CPT | Performed by: INTERNAL MEDICINE

## 2017-02-17 PROCEDURE — 85025 COMPLETE CBC W/AUTO DIFF WBC: CPT | Performed by: FAMILY MEDICINE

## 2017-02-17 PROCEDURE — 85007 BL SMEAR W/DIFF WBC COUNT: CPT | Performed by: FAMILY MEDICINE

## 2017-02-17 PROCEDURE — 93306 TTE W/DOPPLER COMPLETE: CPT

## 2017-02-17 PROCEDURE — 83605 ASSAY OF LACTIC ACID: CPT | Performed by: FAMILY MEDICINE

## 2017-02-17 PROCEDURE — 83735 ASSAY OF MAGNESIUM: CPT | Performed by: FAMILY MEDICINE

## 2017-02-17 PROCEDURE — 80048 BASIC METABOLIC PNL TOTAL CA: CPT | Performed by: FAMILY MEDICINE

## 2017-02-17 PROCEDURE — 84100 ASSAY OF PHOSPHORUS: CPT | Performed by: FAMILY MEDICINE

## 2017-02-17 PROCEDURE — 94799 UNLISTED PULMONARY SVC/PX: CPT

## 2017-02-17 PROCEDURE — 94640 AIRWAY INHALATION TREATMENT: CPT

## 2017-02-17 PROCEDURE — 99233 SBSQ HOSP IP/OBS HIGH 50: CPT | Performed by: FAMILY MEDICINE

## 2017-02-17 RX ORDER — SACCHAROMYCES BOULARDII 250 MG
250 CAPSULE ORAL 2 TIMES DAILY
Status: DISCONTINUED | OUTPATIENT
Start: 2017-02-17 | End: 2017-02-23 | Stop reason: HOSPADM

## 2017-02-17 RX ADMIN — NYSTATIN: 100000 POWDER TOPICAL at 21:53

## 2017-02-17 RX ADMIN — TAZOBACTAM SODIUM AND PIPERACILLIN SODIUM 3.38 G: 375; 3 INJECTION, SOLUTION INTRAVENOUS at 14:58

## 2017-02-17 RX ADMIN — IPRATROPIUM BROMIDE AND ALBUTEROL SULFATE 3 ML: .5; 3 SOLUTION RESPIRATORY (INHALATION) at 20:01

## 2017-02-17 RX ADMIN — SODIUM CHLORIDE 125 ML/HR: 9 INJECTION, SOLUTION INTRAVENOUS at 00:47

## 2017-02-17 RX ADMIN — APIXABAN 2.5 MG: 2.5 TABLET, FILM COATED ORAL at 21:51

## 2017-02-17 RX ADMIN — APIXABAN 2.5 MG: 2.5 TABLET, FILM COATED ORAL at 09:49

## 2017-02-17 RX ADMIN — Medication 250 MG: at 21:50

## 2017-02-17 RX ADMIN — DORZOLAMIDE HYDROCHLORIDE AND TIMOLOL MALEATE 1 DROP: 20; 5 SOLUTION/ DROPS OPHTHALMIC at 09:50

## 2017-02-17 RX ADMIN — TAZOBACTAM SODIUM AND PIPERACILLIN SODIUM 3.38 G: 375; 3 INJECTION, SOLUTION INTRAVENOUS at 21:53

## 2017-02-17 RX ADMIN — Medication 250 MG: at 09:49

## 2017-02-17 RX ADMIN — Medication 125 MG: at 21:51

## 2017-02-17 RX ADMIN — IPRATROPIUM BROMIDE AND ALBUTEROL SULFATE 3 ML: .5; 3 SOLUTION RESPIRATORY (INHALATION) at 13:14

## 2017-02-17 RX ADMIN — DOXYCYCLINE HYCLATE 100 MG: 100 CAPSULE, GELATIN COATED ORAL at 09:49

## 2017-02-17 RX ADMIN — NYSTATIN: 100000 POWDER TOPICAL at 09:49

## 2017-02-17 RX ADMIN — ACETAMINOPHEN 500 MG: 500 TABLET ORAL at 09:49

## 2017-02-17 RX ADMIN — Medication 125 MG: at 14:58

## 2017-02-17 RX ADMIN — TAZOBACTAM SODIUM AND PIPERACILLIN SODIUM 3.38 G: 375; 3 INJECTION, SOLUTION INTRAVENOUS at 02:25

## 2017-02-17 RX ADMIN — TAZOBACTAM SODIUM AND PIPERACILLIN SODIUM 3.38 G: 375; 3 INJECTION, SOLUTION INTRAVENOUS at 09:48

## 2017-02-17 RX ADMIN — DORZOLAMIDE HYDROCHLORIDE AND TIMOLOL MALEATE 1 DROP: 20; 5 SOLUTION/ DROPS OPHTHALMIC at 21:52

## 2017-02-17 RX ADMIN — Medication 125 MG: at 09:49

## 2017-02-17 RX ADMIN — DOXYCYCLINE HYCLATE 100 MG: 100 CAPSULE, GELATIN COATED ORAL at 21:51

## 2017-02-17 NOTE — PLAN OF CARE
Problem: Patient Care Overview (Adult)  Goal: Plan of Care Review  Outcome: Ongoing (interventions implemented as appropriate)  Pt in no apparent distress this shift. She slept well and vital signs are stable.

## 2017-02-17 NOTE — PLAN OF CARE
Problem: Patient Care Overview (Adult)  Goal: Plan of Care Review  Outcome: Ongoing (interventions implemented as appropriate)    02/17/17 1200   Coping/Psychosocial Response Interventions   Plan Of Care Reviewed With daughter   Patient Care Overview   Progress no change   Outcome Evaluation   Outcome Summary/Follow up Plan discussed code status with dtr and dtr is going to talk it over with pt and family. will remain full code for now

## 2017-02-17 NOTE — PLAN OF CARE
Problem: Patient Care Overview (Adult)  Goal: Plan of Care Review  Outcome: Ongoing (interventions implemented as appropriate)    02/16/17 1620   Coping/Psychosocial Response Interventions   Plan Of Care Reviewed With daughter   Patient Care Overview   Progress no change   Outcome Evaluation   Outcome Summary/Follow up Plan pt admitted through er. new palliative consult. spoke to dtr and will start goals discussion. provided information regarding palliative care vs hospice.

## 2017-02-17 NOTE — PROGRESS NOTES
Joana Dixon  9/24/1923  [id number]  2/17/2017    CC: Septic shock with hypotension and lactic acidosis.    Joana Dixon is a 89 y.o. female here for fever, bilateral lower extremity cellulitis, persistent left lower lobe pneumonia, presumed urinary tract infection based on active sediment, and new discovery of positive Clostridium difficile toxin by PCR.         Past medical history:  Past Medical History   Diagnosis Date   • Anemia    • Arthritis    • Chronic kidney disease (CKD), stage III (moderate)    • Chronic pain      right hip   • Confusion    • Dehydration    • Dementia    • Glaucoma    • H/O urinary frequency    • Hypertension    • Insomnia    • Macular degeneration    • PVD (peripheral vascular disease)      wears compression stockings   • Renal disorder    • Upper respiratory infection        Medications:   Current Facility-Administered Medications:   •  acetaminophen (TYLENOL) suppository 650 mg, 650 mg, Rectal, Q4H PRN, Nj Lloyd MD  •  acetaminophen (TYLENOL) tablet 500 mg, 500 mg, Oral, Daily, Nj Lloyd MD, 500 mg at 02/16/17 1451  •  aluminum-magnesium hydroxide-simethicone (MAALOX/MYLANTA) suspension 30 mL, 30 mL, Oral, Q6H PRN, Nj Lloyd MD  •  apixaban (ELIQUIS) tablet 2.5 mg, 2.5 mg, Oral, Q12H, Nj Lloyd MD, 2.5 mg at 02/16/17 2012  •  bisacodyl (DULCOLAX) suppository 10 mg, 10 mg, Rectal, Daily, Nj Lloyd MD, 10 mg at 02/16/17 1446  •  dorzolamide-timolol (COSOPT) ophthalmic solution 1 drop, 1 drop, Both Eyes, BID, Nj Lloyd MD, 1 drop at 02/16/17 2012  •  doxycycline (VIBRAMYCIN) capsule 100 mg, 100 mg, Oral, Q12H, Edgardo Beatty MD, 100 mg at 02/16/17 2012  •  ipratropium-albuterol (DUO-NEB) nebulizer solution 3 mL, 3 mL, Nebulization, 4x Daily - RT, Nj Lloyd MD, 3 mL at 02/16/17 1947  •  magnesium hydroxide (MILK OF MAGNESIA) suspension 2400 mg/10mL 10 mL, 10 mL, Oral, Daily PRN, Nj Lloyd MD  •  nystatin (MYCOSTATIN) powder, , Topical, Q12H,  Nj Lloyd MD  •  ondansetron (ZOFRAN) injection 4 mg, 4 mg, Intravenous, Q6H PRN, Nj Lloyd MD  •  piperacillin-tazobactam (ZOSYN) 3.375 g in dextrose 50 mL IVPB (premix), 3.375 g, Intravenous, Q6H, Nj Lloyd MD, 3.375 g at 02/17/17 0225  •  saccharomyces boulardii (FLORASTOR) capsule 250 mg, 250 mg, Oral, BID, Edgardo Beatty MD  •  sodium chloride 0.9 % flush 1-10 mL, 1-10 mL, Intravenous, PRN, Nj Lloyd MD  •  sodium chloride 0.9 % infusion, 125 mL/hr, Intravenous, Continuous, Nj Lloyd MD, Last Rate: 125 mL/hr at 02/17/17 0047, 125 mL/hr at 02/17/17 0047  •  traMADol (ULTRAM) tablet 50 mg, 50 mg, Oral, Q8H PRN, Nj Lloyd MD, 50 mg at 02/16/17 1057  •  vancomycin oral solution 125 mg, 125 mg, Oral, Q6H, Edgardo Beatty MD  Antibiotics:  IV Anti-Infectives     Ordered     Dose/Rate Route Frequency Start Stop    02/17/17 0725  vancomycin oral solution 125 mg     Ordering Provider:  Edgardo Beatty MD    125 mg Oral Every 6 Hours Scheduled 02/17/17 0800      02/16/17 1509  doxycycline (VIBRAMYCIN) capsule 100 mg     Ordering Provider:  Edgardo Beatty MD    100 mg Oral Every 12 Hours Scheduled 02/16/17 2100      02/16/17 1036  piperacillin-tazobactam (ZOSYN) 3.375 g in dextrose 50 mL IVPB (premix)     Ordering Provider:  Nj Lloyd MD    3.375 g  over 0.5 Hours Intravenous Every 6 Hours 02/16/17 1400      02/16/17 0744  vancomycin (VANCOCIN) IVPB 1 g (premix) in Dextrose 5% 200 mL     Ordering Provider:  Travis Nielsen MD    20 mg/kg × 49.9 kg  over 60 Minutes Intravenous Once 02/16/17 0830 02/16/17 1002    02/16/17 0744  piperacillin-tazobactam (ZOSYN) 3.375 g in dextrose 50 mL IVPB (premix)     Ordering Provider:  Travis Nielsen MD    3.375 g  over 0.5 Hours Intravenous Once 02/16/17 0800 02/16/17 0900          Allergies:  has No Known Allergies.    Review of Systems: All other reviewed and negative except as per HPI    Blood pressure 130/74, pulse 83, temperature  "98.8 °F (37.1 °C), temperature source Oral, resp. rate 18, height 62\" (157.5 cm), weight 146 lb 3.2 oz (66.3 kg), SpO2 (!) 89 %.  GENERAL: Awake and alert.  Quite hard of hearing.  HEENT: Oropharynx without thrush. Sinuses nontender. Dentition in poor repair. No cervical adenopathy. No carotid bruits/ jugular venous distention.   EYES: PERRL. No conjunctival injection. No icterus. EOMI.  LYMPHATICS: No lymphadenopathy of the neck or axillary or inguinal regions.   HEART: 2/6 murmur of aortic valve sclerosis.  No audible diastolic component.   LUNGS: Clear to auscultation anteriorly. No percussion dullness.  Fibrotic crackles in left posterior lung base.  ABDOMEN: Soft, nontender, nondistended. No appreciable HSM.    SKIN: Warm and dry without cutaneous eruptions. No embolic stigmata.  Markedly improved erythema over bilateral anterior tibial surfaces.  Fungal onychomycosis with hypertrophic nails.  Partial-thickness desquamation on bilateral feet.  PSYCHIATRIC: Agitated.  Requests to be \"left alone\".  Disoriented to person place time day and date.  Cranial nerve function intact.       DIAGNOSTICS:  Lab Results   Component Value Date    WBC 6.93 02/17/2017    HGB 8.5 (L) 02/17/2017    HCT 27.7 (L) 02/17/2017     02/17/2017     No results found for: CRP  No results found for: SEDRATE  Lab Results   Component Value Date    GLUCOSE 58 (L) 02/17/2017    BUN 26 (H) 02/17/2017    CREATININE 0.80 02/17/2017    EGFRIFNONA 67 02/17/2017    BCR 32.5 (H) 02/17/2017    CO2 23.0 02/17/2017    CALCIUM 8.0 (L) 02/17/2017    ALBUMIN 3.60 02/16/2017    LABIL2 1.1 (L) 02/16/2017    AST 17 02/16/2017    ALT 10 02/16/2017       Microbiology: Blood cultures ×2 negative at 24 hours of incubation.  Urine culture and sensitivity pending.  Clostridium difficile toxin assay positive.      RADIOLOGY:  Imaging Results (last 72 hours)     Procedure Component Value Units Date/Time    XR Chest 1 View [[id number]] Collected:  02/16/17 0924     " Updated:  02/16/17 1024    Narrative:       EXAMINATION: XR CHEST 1 VW-      INDICATION: Fever.      COMPARISON: 12/29/2016.     FINDINGS: There is persistent airspace disease in the left lower lobe.  The heart is large but radiographically compensated.           Impression:       Persistent left lower lobe airspace disease.     D:  02/16/2017  E:  02/16/2017     This report was finalized on 2/16/2017 10:22 AM by Dr. Lars Washington MD.             Assessment and Plan: Septic shock.  Intravascular volume depletion.  Lactic acidosis.  Persistent left lower lobe pneumonia.  Active urine sediment consistent with urinary tract infection/cultures pending.  Bilateral lower extremity cellulitis.  New discovery of positive Clostridium difficile toxin by PCR.  Maximum temperature over 24 hours is 100.8°.  Currently 98.8.  Serum lactate level is down from 4.2-1.8.  Plasma creatinine is down to 0.8.  Remains on intravenous Zosyn.  Doxycycline given for potential MRSA coverage in addition to atypical pulmonary pathogens.  I would proceed with discontinuation of intravenous vancomycin given the patient's extreme age and chronic kidney disease.  I did add vancomycin suspension and probiotic therapy with Florastor earlier today.  The patient has already been placed in spore precautions.  I spoke at length with the patient's adult daughter.  I'll see the patient in follow-up on Monday.  L [facility] is available for weekend problems.      Edgardo Beatty MD  2/17/2017

## 2017-02-17 NOTE — PROGRESS NOTES
Continued Stay Note  [facility]     Patient Name: Joana Dixon  MRN: [id number]  Today's Date: 2/17/2017    Admit Date: 2/16/2017          Discharge Plan       02/17/17 1156    Case Management/Social Work Plan    Plan [facility]    Patient/Family In Agreement With Plan yes    Additional Comments GARRETT called [facility] and spoke with Ivette, as pt arrived from [facility]. Ivette reported that pt can go back and there is a bed hol for pt when medically ready.    Final Note    Final Note SW is following              Discharge Codes     None        Expected Discharge Date and Time     Expected Discharge Date Expected Discharge Time    Feb 20, 2017             CHEL Lewis

## 2017-02-17 NOTE — SIGNIFICANT NOTE
Palliative Team Meeting Attendance  13:00             DO Nancy Gaytan MD, RN CHPN                            WILLIAM Velasco M.Div., [facility], [facility]              LISANDRO Herring, RN, PN

## 2017-02-17 NOTE — PROGRESS NOTES
[facility] Medicine Services    ASSESSMENT / PLAN          1.Sepsis, + C diff colitis, Probable  UTI, possible pneumonia or infiltrates from last pneumonia, continue abx per ID along with po vancomycin, follow culturs, leukocytosis improving , afebrile today.  2. Right heel and sacral decubitus ulcer , wound care following.   3. Chronic PVD, Hearing impaired, Anemia, Chronic right hip pain, stable  4. Bilateral lower extremity cellulitis, improving  5. Recent DVT, continue eliquis   6. Severe sepsis, resolved lactic acidosis, Hypotension resolved, po intake ok,   7. Supportive care    Dementia with superimposed encephalopathy, improving   Antibiotics:  IV Anti-Infectives     Ordered     Dose/Rate Route Frequency Start Stop    02/17/17 0725  vancomycin oral solution 125 mg     Ordering Provider:  Edgardo Beatty MD    125 mg Oral Every 6 Hours Scheduled 02/17/17 0800      02/16/17 1509  doxycycline (VIBRAMYCIN) capsule 100 mg     Ordering Provider:  Edgardo Beatty MD    100 mg Oral Every 12 Hours Scheduled 02/16/17 2100      02/16/17 1036  piperacillin-tazobactam (ZOSYN) 3.375 g in dextrose 50 mL IVPB (premix)     Ordering Provider:  Nj Lloyd MD    3.375 g  over 0.5 Hours Intravenous Every 6 Hours 02/16/17 1400      02/16/17 0744  vancomycin (VANCOCIN) IVPB 1 g (premix) in Dextrose 5% 200 mL     Ordering Provider:  Travis Nielsen MD    20 mg/kg × 49.9 kg  over 60 Minutes Intravenous Once 02/16/17 0830 02/16/17 1002    02/16/17 0744  piperacillin-tazobactam (ZOSYN) 3.375 g in dextrose 50 mL IVPB (premix)     Ordering Provider:  Travis Nielsen MD    3.375 g  over 0.5 Hours Intravenous Once 02/16/17 0800 02/16/17 0900          Length of Stay 1 Days   Code- FC  DVT Prophylaxis- on eliquis   Condition--serious  Prognosis-- guarded  Expected Discharge disposition in   3-4        Days to rehab   --Highly complex set of issues with high risk for further serious morbidity and other  serious sequela, Time spent >35 minutes with > 50% time spent in the room face to face with patient and relatives -daughter at bedside  paliative care and id consult reviewed       SUBJECTIVE--HPI/ Events overnight / CC- Hospital Follow up visit/ ROS-not detailed ,  as performed below    Per patient she c/o right hip and leg pain chronic per daughter  Per daughter looking better, able to eat  No vomitings  Does not use oxygen at [facility]    OBJECTIVE        Vital Sign Min/Max for last 24 hours  Temp  Min: 98.6 °F (37 °C)  Max: 99.8 °F (37.7 °C)   BP  Min: 93/62  Max: 131/88   Pulse  Min: 63  Max: 83   Resp  Min: 16  Max: 18   SpO2  Min: 89 %  Max: 98 %   Flow (L/min)  Min: 1  Max: 2      Intake/Output Summary (Last 24 hours) at 02/17/17 7718  Last data filed at 02/17/17 1314   Gross per 24 hour   Intake    720 ml   Output      0 ml   Net    720 ml           Constitutional: Mild Apparent distress lethargic , awakable, folow simple commands today, moving extremities    Eyes:  sclerae anicteric, no conjunctival injection  Neck: supple, no thyromegaly, trachea midline  Respiratory: Clear to auscultation except left lower base reveals, nonlabored respirations   Cardiovascular: RRR, no murmurs, rubs, or gallops, palpable pedal pulses bilaterally  Gastrointestinal: Positive bowel sounds, soft, nontender, nondistended, no  suprapubic tenderness today cf yesterday  Musculoskeletal:   lower extremity findings indicative of cellulitis with warmth and erythema and tenderness much improved over last 24 hrs   Psychiatric: oriented x 1, flat  affect, cooperative  Neurologic: Strength symmetric in all extremities,     Right heel showed the unstageable ulcer covered with the granulation tissue  Sacral ulcer not examined today    Medications    Current Facility-Administered Medications:   •  acetaminophen (TYLENOL) suppository 650 mg, 650 mg, Rectal, Q4H PRN, Nj Lloyd MD  •  acetaminophen (TYLENOL) tablet 500 mg, 500 mg, Oral,  Daily, Nj Lloyd MD, 500 mg at 02/17/17 0949  •  aluminum-magnesium hydroxide-simethicone (MAALOX/MYLANTA) suspension 30 mL, 30 mL, Oral, Q6H PRN, Nj Lloyd MD  •  apixaban (ELIQUIS) tablet 2.5 mg, 2.5 mg, Oral, Q12H, Nj Lloyd MD, 2.5 mg at 02/17/17 0949  •  bisacodyl (DULCOLAX) suppository 10 mg, 10 mg, Rectal, Daily, Nj Lloyd MD, 10 mg at 02/16/17 1446  •  dorzolamide-timolol (COSOPT) ophthalmic solution 1 drop, 1 drop, Both Eyes, BID, Nj Lloyd MD, 1 drop at 02/17/17 0950  •  doxycycline (VIBRAMYCIN) capsule 100 mg, 100 mg, Oral, Q12H, Edgardo Beatty MD, 100 mg at 02/17/17 0949  •  ipratropium-albuterol (DUO-NEB) nebulizer solution 3 mL, 3 mL, Nebulization, 4x Daily - RT, Nj Lloyd MD, 3 mL at 02/17/17 1314  •  magnesium hydroxide (MILK OF MAGNESIA) suspension 2400 mg/10mL 10 mL, 10 mL, Oral, Daily PRN, Nj Lloyd MD  •  nystatin (MYCOSTATIN) powder, , Topical, Q12H, Nj Lloyd MD  •  ondansetron (ZOFRAN) injection 4 mg, 4 mg, Intravenous, Q6H PRN, Nj Lloyd MD  •  piperacillin-tazobactam (ZOSYN) 3.375 g in dextrose 50 mL IVPB (premix), 3.375 g, Intravenous, Q6H, Nj Lloyd MD, 3.375 g at 02/17/17 1458  •  saccharomyces boulardii (FLORASTOR) capsule 250 mg, 250 mg, Oral, BID, Edgardo Beatty MD, 250 mg at 02/17/17 0949  •  sodium chloride 0.9 % flush 1-10 mL, 1-10 mL, Intravenous, PRN, Nj Lloyd MD  •  sodium chloride 0.9 % infusion, 125 mL/hr, Intravenous, Continuous, Nj Lloyd MD, Last Rate: 125 mL/hr at 02/17/17 0047, 125 mL/hr at 02/17/17 0047  •  traMADol (ULTRAM) tablet 50 mg, 50 mg, Oral, Q8H PRN, Nj Lloyd MD, 50 mg at 02/16/17 1057  •  vancomycin oral solution 125 mg, 125 mg, Oral, Q6H, Edgardo Beatty MD, 125 mg at 02/17/17 1458  I have reviewed the labs, culture data, radiology results, and diagnostic studies.    Results from last 7 days  Lab Units 02/17/17  0547 02/16/17  0812   SODIUM mmol/L 137 138   POTASSIUM mmol/L 4.1 4.1    CHLORIDE mmol/L 110* 105   TOTAL CO2 mmol/L 23.0 25.0   BUN mg/dL 26* 33*   CREATININE mg/dL 0.80 1.00   CALCIUM mg/dL 8.0* 9.3   BILIRUBIN mg/dL  --  0.4   ALK PHOS U/L  --  76   ALT (SGPT) U/L  --  10   AST (SGOT) U/L  --  17   GLUCOSE mg/dL 58* 101*       Results from last 7 days  Lab Units 02/17/17  0547 02/16/17  0812   WBC 10*3/mm3 6.93 13.66*   HEMOGLOBIN g/dL 8.5* 9.7*   HEMATOCRIT % 27.7* 30.7*   PLATELETS 10*3/mm3 323 387           Culture Data:    Radiology Results:  Imaging Results (last 24 hours)     ** No results found for the last 24 hours. **        *. Please note that portions of this note were completed with a voice recognition program. Efforts were made to edit the dictations, but occasionally words are mistranscribed.  Nj Lloyd MD02/17/175:55 PM

## 2017-02-17 NOTE — PROGRESS NOTES
"Palliative Care Progress Note    Date of Admission: 2/16/2017    Subjective:  Daughter who is at bedside states patient continues to have some restlessness as well as some slight agitation.  No current facility-administered medications on file prior to encounter.      Current Outpatient Prescriptions on File Prior to Encounter   Medication Sig Dispense Refill   • acetaminophen (TYLENOL) 500 MG tablet Take 500 mg by mouth Daily.     • acetaminophen (TYLENOL) 650 MG suppository Insert 650 mg into the rectum Every 4 (Four) Hours As Needed for mild pain (1-3).     • amLODIPine (NORVASC) 2.5 MG tablet Take 1 tablet by mouth Daily. 30 tablet 0   • apixaban (ELIQUIS) 5 MG tablet tablet Take 1 tablet by mouth Every 12 (Twelve) Hours. 60 tablet 0   • castor oil-balsam peru (VENELEX) ointment Apply 1 application topically Every 12 (Twelve) Hours. 30 g 0   • diclofenac (VOLTAREN) 1 % gel gel Apply 4 g topically Daily As Needed. TO HANDS      • dorzolamide-timolol (COSOPT) 22.3-6.8 MG/ML ophthalmic solution Administer 1 drop to both eyes 2 (Two) Times a Day.     • ipratropium-albuterol (DUO-NEB) 0.5-2.5 mg/mL nebulizer Take 3 mL by nebulization 4 (Four) Times a Day.     • nystatin (MYCOSTATIN) 785371 UNIT/GM powder Apply  topically Every 12 (Twelve) Hours. 15 g 0       sodium chloride 125 mL/hr Last Rate: 125 mL/hr (02/17/17 0047)     •  acetaminophen  •  aluminum-magnesium hydroxide-simethicone  •  magnesium hydroxide  •  ondansetron  •  sodium chloride  •  traMADol    Objective:   Visit Vitals   • /69 (BP Location: Left arm)   • Pulse 68   • Temp 98.8 °F (37.1 °C) (Oral)   • Resp 16   • Ht 62\" (157.5 cm)   • Wt 146 lb 3.2 oz (66.3 kg)  Comment: bed weight   • SpO2 93%   • BMI 26.74 kg/m2        Intake/Output Summary (Last 24 hours) at 02/17/17 1258  Last data filed at 02/17/17 1100   Gross per 24 hour   Intake    410 ml   Output      0 ml   Net    410 ml     Physical Exam:      General Appearance:    asleep, arousable, " restless appearing, continuously calling out I don't want to get up and really does not answer any other questions.  Frail-appearing    Head:    Normocephalic, without obvious abnormality, atraumatic   Eyes:            Lids and lashes normal, conjunctivae and sclerae normal, no   icterus, no pallor, corneas clear, PERRLA   Ears:    Ears appear intact with no abnormalities noted   Throat:   No oral lesions, no thrush, oral mucosa moist   Neck:   No adenopathy, supple, trachea midline, no thyromegaly, no     carotid bruit, no JVD   Back:     No kyphosis present, no scoliosis present, no skin lesions,       erythema or scars, no tenderness to percussion or                   palpation,   range of motion normal   Lungs:     Clear to auscultation,respirations regular, even and                   unlabored    Heart:    Regular rhythm and normal rate, normal S1 and S2, no            murmur, no gallop, no rub, no click   Breast Exam:    Deferred   Abdomen:     Normal bowel sounds, no masses, no organomegaly, soft        non-tender, non-distended, no guarding, no rebound                 tenderness   Genitalia:    Deferred   Extremities:   Moves all extremities well, no edema, no cyanosis, no              redness   Pulses:   Pulses palpable and equal bilaterally   Skin:   No bleeding, bruising or rash   Lymph nodes:   No palpable adenopathy           Results from last 7 days  Lab Units 02/17/17  0547   WBC 10*3/mm3 6.93   HEMOGLOBIN g/dL 8.5*   HEMATOCRIT % 27.7*   PLATELETS 10*3/mm3 323       Results from last 7 days  Lab Units 02/17/17  0547 02/16/17  0812   SODIUM mmol/L 137 138   POTASSIUM mmol/L 4.1 4.1   CHLORIDE mmol/L 110* 105   TOTAL CO2 mmol/L 23.0 25.0   BUN mg/dL 26* 33*   CREATININE mg/dL 0.80 1.00   CALCIUM mg/dL 8.0* 9.3   BILIRUBIN mg/dL  --  0.4   ALK PHOS U/L  --  76   ALT (SGPT) U/L  --  10   AST (SGOT) U/L  --  17   GLUCOSE mg/dL 58* 101*       Impression: Sepsis POA  UTI POA  Agitation  Goals of care  Plan:  Did have a long discussion with the patient's daughter at bedside about advanced care planning.  During this discussion we discussed future hospitalizations as well as even talking about hospice.  We also did discuss CODE STATUS.  I answered the patient's daughter's questions to the best my abilities.  I highly recommend that she had discussions with her siblings and based on their decisions we will have further discussions as appropriate.  Time: More than 50% of time spent in counseling and coordination of care:  Total face-to-face/floor time 30 min.  Time spent in counseling 20 min. Counseling included the following topics: see above      Víctor Stovall DO  02/17/17  12:58 PM

## 2017-02-18 LAB
ANION GAP SERPL CALCULATED.3IONS-SCNC: 4 MMOL/L (ref 3–11)
BACTERIA SPEC AEROBE CULT: ABNORMAL
BASOPHILS # BLD AUTO: 0.01 10*3/MM3 (ref 0–0.2)
BASOPHILS NFR BLD AUTO: 0.2 % (ref 0–1)
BUN BLD-MCNC: 21 MG/DL (ref 9–23)
BUN/CREAT SERPL: 30 (ref 7–25)
CALCIUM SPEC-SCNC: 8.1 MG/DL (ref 8.7–10.4)
CHLORIDE SERPL-SCNC: 112 MMOL/L (ref 99–109)
CO2 SERPL-SCNC: 20 MMOL/L (ref 20–31)
CREAT BLD-MCNC: 0.7 MG/DL (ref 0.6–1.3)
DEPRECATED RDW RBC AUTO: 52.6 FL (ref 37–54)
EOSINOPHIL # BLD AUTO: 0.92 10*3/MM3 (ref 0.1–0.3)
EOSINOPHIL NFR BLD AUTO: 17.8 % (ref 0–3)
ERYTHROCYTE [DISTWIDTH] IN BLOOD BY AUTOMATED COUNT: 15.3 % (ref 11.3–14.5)
GFR SERPL CREATININE-BSD FRML MDRD: 78 ML/MIN/1.73
GLUCOSE BLD-MCNC: 73 MG/DL (ref 70–100)
HCT VFR BLD AUTO: 27.4 % (ref 34.5–44)
HGB BLD-MCNC: 8.9 G/DL (ref 11.5–15.5)
IMM GRANULOCYTES # BLD: 0.03 10*3/MM3 (ref 0–0.03)
IMM GRANULOCYTES NFR BLD: 0.6 % (ref 0–0.6)
LYMPHOCYTES # BLD AUTO: 1.11 10*3/MM3 (ref 0.6–4.8)
LYMPHOCYTES NFR BLD AUTO: 21.5 % (ref 24–44)
MAGNESIUM SERPL-MCNC: 1.7 MG/DL (ref 1.3–2.7)
MCH RBC QN AUTO: 30.2 PG (ref 27–31)
MCHC RBC AUTO-ENTMCNC: 32.5 G/DL (ref 32–36)
MCV RBC AUTO: 92.9 FL (ref 80–99)
MONOCYTES # BLD AUTO: 0.52 10*3/MM3 (ref 0–1)
MONOCYTES NFR BLD AUTO: 10.1 % (ref 0–12)
NEUTROPHILS # BLD AUTO: 2.57 10*3/MM3 (ref 1.5–8.3)
NEUTROPHILS NFR BLD AUTO: 49.8 % (ref 41–71)
PLATELET # BLD AUTO: 324 10*3/MM3 (ref 150–450)
PMV BLD AUTO: 9.7 FL (ref 6–12)
POTASSIUM BLD-SCNC: 3.9 MMOL/L (ref 3.5–5.5)
RBC # BLD AUTO: 2.95 10*6/MM3 (ref 3.89–5.14)
SODIUM BLD-SCNC: 136 MMOL/L (ref 132–146)
WBC NRBC COR # BLD: 5.16 10*3/MM3 (ref 3.5–10.8)

## 2017-02-18 PROCEDURE — 80048 BASIC METABOLIC PNL TOTAL CA: CPT | Performed by: FAMILY MEDICINE

## 2017-02-18 PROCEDURE — 94760 N-INVAS EAR/PLS OXIMETRY 1: CPT

## 2017-02-18 PROCEDURE — 99232 SBSQ HOSP IP/OBS MODERATE 35: CPT | Performed by: FAMILY MEDICINE

## 2017-02-18 PROCEDURE — 94640 AIRWAY INHALATION TREATMENT: CPT

## 2017-02-18 PROCEDURE — 25010000002 PIPERACILLIN SOD-TAZOBACTAM PER 1 G: Performed by: FAMILY MEDICINE

## 2017-02-18 PROCEDURE — 85025 COMPLETE CBC W/AUTO DIFF WBC: CPT | Performed by: FAMILY MEDICINE

## 2017-02-18 PROCEDURE — 83735 ASSAY OF MAGNESIUM: CPT | Performed by: FAMILY MEDICINE

## 2017-02-18 RX ORDER — MAGNESIUM SULFATE HEPTAHYDRATE 40 MG/ML
4 INJECTION, SOLUTION INTRAVENOUS ONCE
Status: COMPLETED | OUTPATIENT
Start: 2017-02-18 | End: 2017-02-18

## 2017-02-18 RX ADMIN — Medication 125 MG: at 06:49

## 2017-02-18 RX ADMIN — TAZOBACTAM SODIUM AND PIPERACILLIN SODIUM 3.38 G: 375; 3 INJECTION, SOLUTION INTRAVENOUS at 02:41

## 2017-02-18 RX ADMIN — DOXYCYCLINE HYCLATE 100 MG: 100 CAPSULE, GELATIN COATED ORAL at 21:07

## 2017-02-18 RX ADMIN — TAZOBACTAM SODIUM AND PIPERACILLIN SODIUM 3.38 G: 375; 3 INJECTION, SOLUTION INTRAVENOUS at 09:16

## 2017-02-18 RX ADMIN — APIXABAN 2.5 MG: 2.5 TABLET, FILM COATED ORAL at 09:17

## 2017-02-18 RX ADMIN — Medication 125 MG: at 23:31

## 2017-02-18 RX ADMIN — NYSTATIN: 100000 POWDER TOPICAL at 09:17

## 2017-02-18 RX ADMIN — Medication 125 MG: at 20:03

## 2017-02-18 RX ADMIN — Medication 250 MG: at 23:31

## 2017-02-18 RX ADMIN — IPRATROPIUM BROMIDE AND ALBUTEROL SULFATE 3 ML: .5; 3 SOLUTION RESPIRATORY (INHALATION) at 16:42

## 2017-02-18 RX ADMIN — DORZOLAMIDE HYDROCHLORIDE AND TIMOLOL MALEATE 1 DROP: 20; 5 SOLUTION/ DROPS OPHTHALMIC at 21:07

## 2017-02-18 RX ADMIN — Medication 125 MG: at 01:02

## 2017-02-18 RX ADMIN — IPRATROPIUM BROMIDE AND ALBUTEROL SULFATE 3 ML: .5; 3 SOLUTION RESPIRATORY (INHALATION) at 08:50

## 2017-02-18 RX ADMIN — ACETAMINOPHEN 500 MG: 500 TABLET ORAL at 09:17

## 2017-02-18 RX ADMIN — DORZOLAMIDE HYDROCHLORIDE AND TIMOLOL MALEATE 1 DROP: 20; 5 SOLUTION/ DROPS OPHTHALMIC at 09:17

## 2017-02-18 RX ADMIN — IPRATROPIUM BROMIDE AND ALBUTEROL SULFATE 3 ML: .5; 3 SOLUTION RESPIRATORY (INHALATION) at 21:04

## 2017-02-18 RX ADMIN — Medication 125 MG: at 14:27

## 2017-02-18 RX ADMIN — NYSTATIN: 100000 POWDER TOPICAL at 21:08

## 2017-02-18 RX ADMIN — TAZOBACTAM SODIUM AND PIPERACILLIN SODIUM 3.38 G: 375; 3 INJECTION, SOLUTION INTRAVENOUS at 15:42

## 2017-02-18 RX ADMIN — MAGNESIUM SULFATE HEPTAHYDRATE 4 G: 40 INJECTION, SOLUTION INTRAVENOUS at 14:27

## 2017-02-18 RX ADMIN — TAZOBACTAM SODIUM AND PIPERACILLIN SODIUM 3.38 G: 375; 3 INJECTION, SOLUTION INTRAVENOUS at 20:03

## 2017-02-18 RX ADMIN — DOXYCYCLINE HYCLATE 100 MG: 100 CAPSULE, GELATIN COATED ORAL at 09:17

## 2017-02-18 RX ADMIN — APIXABAN 2.5 MG: 2.5 TABLET, FILM COATED ORAL at 21:07

## 2017-02-18 RX ADMIN — Medication 250 MG: at 09:17

## 2017-02-18 NOTE — PLAN OF CARE
Problem: Patient Care Overview (Adult)  Goal: Plan of Care Review  Outcome: Ongoing (interventions implemented as appropriate)  Pt vitals stable this shift and daughter stated that the patient seems more clear with her speaking and thought.      02/18/17 0636   Coping/Psychosocial Response Interventions   Plan Of Care Reviewed With patient;daughter   Patient Care Overview   Progress no change

## 2017-02-19 LAB
ANION GAP SERPL CALCULATED.3IONS-SCNC: 5 MMOL/L (ref 3–11)
BASOPHILS # BLD AUTO: 0.02 10*3/MM3 (ref 0–0.2)
BASOPHILS NFR BLD AUTO: 0.4 % (ref 0–1)
BUN BLD-MCNC: 16 MG/DL (ref 9–23)
BUN/CREAT SERPL: 22.9 (ref 7–25)
CALCIUM SPEC-SCNC: 8.4 MG/DL (ref 8.7–10.4)
CHLORIDE SERPL-SCNC: 113 MMOL/L (ref 99–109)
CO2 SERPL-SCNC: 22 MMOL/L (ref 20–31)
CREAT BLD-MCNC: 0.7 MG/DL (ref 0.6–1.3)
DEPRECATED RDW RBC AUTO: 48.3 FL (ref 37–54)
EOSINOPHIL # BLD AUTO: 0.53 10*3/MM3 (ref 0.1–0.3)
EOSINOPHIL NFR BLD AUTO: 10.9 % (ref 0–3)
ERYTHROCYTE [DISTWIDTH] IN BLOOD BY AUTOMATED COUNT: 14.9 % (ref 11.3–14.5)
GFR SERPL CREATININE-BSD FRML MDRD: 78 ML/MIN/1.73
GLUCOSE BLD-MCNC: 61 MG/DL (ref 70–100)
HCT VFR BLD AUTO: 28 % (ref 34.5–44)
HGB BLD-MCNC: 9.5 G/DL (ref 11.5–15.5)
IMM GRANULOCYTES # BLD: 0.02 10*3/MM3 (ref 0–0.03)
IMM GRANULOCYTES NFR BLD: 0.4 % (ref 0–0.6)
LYMPHOCYTES # BLD AUTO: 1.45 10*3/MM3 (ref 0.6–4.8)
LYMPHOCYTES NFR BLD AUTO: 29.7 % (ref 24–44)
MAGNESIUM SERPL-MCNC: 2.2 MG/DL (ref 1.3–2.7)
MCH RBC QN AUTO: 30.3 PG (ref 27–31)
MCHC RBC AUTO-ENTMCNC: 33.9 G/DL (ref 32–36)
MCV RBC AUTO: 89.2 FL (ref 80–99)
MONOCYTES # BLD AUTO: 0.41 10*3/MM3 (ref 0–1)
MONOCYTES NFR BLD AUTO: 8.4 % (ref 0–12)
NEUTROPHILS # BLD AUTO: 2.45 10*3/MM3 (ref 1.5–8.3)
NEUTROPHILS NFR BLD AUTO: 50.2 % (ref 41–71)
PLATELET # BLD AUTO: 350 10*3/MM3 (ref 150–450)
PMV BLD AUTO: 9.4 FL (ref 6–12)
POTASSIUM BLD-SCNC: 3.9 MMOL/L (ref 3.5–5.5)
RBC # BLD AUTO: 3.14 10*6/MM3 (ref 3.89–5.14)
SODIUM BLD-SCNC: 140 MMOL/L (ref 132–146)
WBC NRBC COR # BLD: 4.88 10*3/MM3 (ref 3.5–10.8)

## 2017-02-19 PROCEDURE — 94760 N-INVAS EAR/PLS OXIMETRY 1: CPT

## 2017-02-19 PROCEDURE — 94640 AIRWAY INHALATION TREATMENT: CPT

## 2017-02-19 PROCEDURE — 80048 BASIC METABOLIC PNL TOTAL CA: CPT | Performed by: FAMILY MEDICINE

## 2017-02-19 PROCEDURE — 99232 SBSQ HOSP IP/OBS MODERATE 35: CPT | Performed by: FAMILY MEDICINE

## 2017-02-19 PROCEDURE — 94799 UNLISTED PULMONARY SVC/PX: CPT

## 2017-02-19 PROCEDURE — 85025 COMPLETE CBC W/AUTO DIFF WBC: CPT | Performed by: FAMILY MEDICINE

## 2017-02-19 PROCEDURE — 25010000002 PIPERACILLIN SOD-TAZOBACTAM PER 1 G: Performed by: FAMILY MEDICINE

## 2017-02-19 PROCEDURE — 83735 ASSAY OF MAGNESIUM: CPT | Performed by: FAMILY MEDICINE

## 2017-02-19 RX ORDER — DEXTROSE AND SODIUM CHLORIDE 5; .45 G/100ML; G/100ML
75 INJECTION, SOLUTION INTRAVENOUS CONTINUOUS
Status: DISCONTINUED | OUTPATIENT
Start: 2017-02-19 | End: 2017-02-21

## 2017-02-19 RX ADMIN — IPRATROPIUM BROMIDE AND ALBUTEROL SULFATE 3 ML: .5; 3 SOLUTION RESPIRATORY (INHALATION) at 22:04

## 2017-02-19 RX ADMIN — DOXYCYCLINE HYCLATE 100 MG: 100 CAPSULE, GELATIN COATED ORAL at 20:07

## 2017-02-19 RX ADMIN — NYSTATIN: 100000 POWDER TOPICAL at 09:25

## 2017-02-19 RX ADMIN — Medication 125 MG: at 07:04

## 2017-02-19 RX ADMIN — Medication 250 MG: at 09:24

## 2017-02-19 RX ADMIN — APIXABAN 2.5 MG: 2.5 TABLET, FILM COATED ORAL at 09:25

## 2017-02-19 RX ADMIN — TAZOBACTAM SODIUM AND PIPERACILLIN SODIUM 3.38 G: 375; 3 INJECTION, SOLUTION INTRAVENOUS at 08:59

## 2017-02-19 RX ADMIN — DORZOLAMIDE HYDROCHLORIDE AND TIMOLOL MALEATE 1 DROP: 20; 5 SOLUTION/ DROPS OPHTHALMIC at 09:25

## 2017-02-19 RX ADMIN — IPRATROPIUM BROMIDE AND ALBUTEROL SULFATE 3 ML: .5; 3 SOLUTION RESPIRATORY (INHALATION) at 12:26

## 2017-02-19 RX ADMIN — TAZOBACTAM SODIUM AND PIPERACILLIN SODIUM 3.38 G: 375; 3 INJECTION, SOLUTION INTRAVENOUS at 13:28

## 2017-02-19 RX ADMIN — TAZOBACTAM SODIUM AND PIPERACILLIN SODIUM 3.38 G: 375; 3 INJECTION, SOLUTION INTRAVENOUS at 02:33

## 2017-02-19 RX ADMIN — DOXYCYCLINE HYCLATE 100 MG: 100 CAPSULE, GELATIN COATED ORAL at 09:24

## 2017-02-19 RX ADMIN — APIXABAN 2.5 MG: 2.5 TABLET, FILM COATED ORAL at 20:07

## 2017-02-19 RX ADMIN — Medication 125 MG: at 12:00

## 2017-02-19 RX ADMIN — IPRATROPIUM BROMIDE AND ALBUTEROL SULFATE 3 ML: .5; 3 SOLUTION RESPIRATORY (INHALATION) at 16:14

## 2017-02-19 RX ADMIN — IPRATROPIUM BROMIDE AND ALBUTEROL SULFATE 3 ML: .5; 3 SOLUTION RESPIRATORY (INHALATION) at 08:55

## 2017-02-19 RX ADMIN — TAZOBACTAM SODIUM AND PIPERACILLIN SODIUM 3.38 G: 375; 3 INJECTION, SOLUTION INTRAVENOUS at 20:07

## 2017-02-19 RX ADMIN — ACETAMINOPHEN 500 MG: 500 TABLET ORAL at 09:24

## 2017-02-19 RX ADMIN — DEXTROSE AND SODIUM CHLORIDE 75 ML/HR: 5; 450 INJECTION, SOLUTION INTRAVENOUS at 09:23

## 2017-02-19 NOTE — PROGRESS NOTES
[facility] Medicine Services    ASSESSMENT / PLAN          1.Sepsis, + C diff colitis, Probable  UTI, possible pneumonia or infiltrates from last pneumonia, continue abx per ID along with po vancomycin+ Probiotic therapy , follow cultures, leukocytosis improving , afebrile today. Per nurses 1 loose stools overnight , forming   2. Right heel and sacral decubitus ulcer , wound care following.   3. Chronic PVD, Hearing impaired, Anemia, Chronic right hip pain, stable  4. Bilateral lower extremity cellulitis, improving  5. Recent DVT, continue eliquis   6. Resolved  Severe sepsis, resolved lactic acidosis, Hypotension resolved,     7. Supportive care   Malnutrition   Dementia with superimposed encephalopathy, improving    Discussed with daughter yesterday evening  Length of Stay 3 Days   Code- FC  DVT Prophylaxis- on eliquis   Condition--serious  Prognosis-- guarded  Expected Discharge disposition in   2-3        Days to rehab      paliative care and id consult reviewed   --Highly complex set of issues with high risk for further serious morbidity and other serious sequela, Time spent >25 minutes with > 50% time spent in the room face to face with patient and nurses        SUBJECTIVE--HPI/ Events overnight / CC- Hospital Follow up visit/ ROS-not detailed ,  as performed below    Per patient she c/o right hip and leg pain, still persistent ,     No vomitings  Does not use oxygen at [facility], currently nasal cannula oxygen Per nurse two loose stools overnight     OBJECTIVE        Vital Sign Min/Max for last 24 hours  Temp  Min: 97.3 °F (36.3 °C)  Max: 97.7 °F (36.5 °C)   BP  Min: 126/64  Max: 154/88   Pulse  Min: 57  Max: 66   Resp  Min: 16  Max: 22   SpO2  Min: 90 %  Max: 97 %   Flow (L/min)  Min: 2  Max: 3      Intake/Output Summary (Last 24 hours) at 02/19/17 1219  Last data filed at 02/19/17 1005   Gross per 24 hour   Intake    230 ml   Output      0 ml   Net    230 ml            Constitutional: no  Apparent distress awake  , try to follow simple commands today, moving extremities   , frail  Eyes:  sclerae anicteric, no conjunctival injection  Neck: supple, no thyromegaly, trachea midline  Respiratory: Clear to auscultation except left lower base reveals, nonlabored respirations   Cardiovascular: RRR, no murmurs, rubs, or gallops, palpable pedal pulses bilaterally  Gastrointestinal: Positive bowel sounds, soft, nontender, nondistended, no  suprapubic tenderness   Musculoskeletal:   lower extremity findings indicative of cellulitis with warmth and erythema and tenderness much improved over last 48 hours  Psychiatric: oriented x 1, flat  affect, cooperative  Neurologic: Strength symmetric in all extremities,     Right heel showed the unstageable ulcer covered with the granulation tissue-examined today with removal of dressing   Sacral ulcer not examined today    Medications    Current Facility-Administered Medications:   •  acetaminophen (TYLENOL) suppository 650 mg, 650 mg, Rectal, Q4H PRN, Nj Lloyd MD  •  acetaminophen (TYLENOL) tablet 500 mg, 500 mg, Oral, Daily, Nj Lloyd MD, 500 mg at 02/19/17 0924  •  aluminum-magnesium hydroxide-simethicone (MAALOX/MYLANTA) suspension 30 mL, 30 mL, Oral, Q6H PRN, Nj Lloyd MD  •  apixaban (ELIQUIS) tablet 2.5 mg, 2.5 mg, Oral, Q12H, Nj Lloyd MD, 2.5 mg at 02/19/17 0925  •  bisacodyl (DULCOLAX) suppository 10 mg, 10 mg, Rectal, Daily, Nj Lloyd MD, 10 mg at 02/16/17 1446  •  dextrose 5 % and sodium chloride 0.45 % infusion, 75 mL/hr, Intravenous, Continuous, Nj Lloyd MD, Last Rate: 75 mL/hr at 02/19/17 0923, 75 mL/hr at 02/19/17 0923  •  dorzolamide-timolol (COSOPT) ophthalmic solution 1 drop, 1 drop, Both Eyes, BID, Nj Lloyd MD, 1 drop at 02/19/17 0925  •  doxycycline (VIBRAMYCIN) capsule 100 mg, 100 mg, Oral, Q12H, Edgardo Beatty MD, 100 mg at 02/19/17 0924  •  ipratropium-albuterol (DUO-NEB) nebulizer  solution 3 mL, 3 mL, Nebulization, 4x Daily - RT, Nj Lloyd MD, 3 mL at 02/19/17 0855  •  magnesium hydroxide (MILK OF MAGNESIA) suspension 2400 mg/10mL 10 mL, 10 mL, Oral, Daily PRN, Nj Lloyd MD  •  magnesium sulfate in D5W 1g/100mL (PREMIX) IVPB 1 g, 1 g, Intravenous, PRN **OR** magnesium sulfate 8 g in dextrose (D5W) 5 % 250 mL infusion, 8 g, Intravenous, PRN **OR** magnesium sulfate 6 g in dextrose (D5W) 5 % 250 mL infusion, 6 g, Intravenous, PRN **OR** magnesium sulfate in D5W 1g/100mL (PREMIX) IVPB 1 g, 1 g, Intravenous, PRN, Casie M Mayne, PA  •  nystatin (MYCOSTATIN) powder, , Topical, Q12H, Nj Lloyd MD  •  ondansetron (ZOFRAN) injection 4 mg, 4 mg, Intravenous, Q6H PRN, Nj Lloyd MD  •  piperacillin-tazobactam (ZOSYN) 3.375 g in dextrose 50 mL IVPB (premix), 3.375 g, Intravenous, Q6H, Nj Lloyd MD, 3.375 g at 02/19/17 0859  •  saccharomyces boulardii (FLORASTOR) capsule 250 mg, 250 mg, Oral, BID, Edgardo Beatty MD, 250 mg at 02/19/17 0924  •  sodium chloride 0.9 % flush 1-10 mL, 1-10 mL, Intravenous, PRN, Nj Lloyd MD  •  traMADol (ULTRAM) tablet 50 mg, 50 mg, Oral, Q8H PRN, Nj Lloyd MD, 50 mg at 02/16/17 1057  •  vancomycin oral solution 125 mg, 125 mg, Oral, Q6H, Edgardo Beatty MD, 125 mg at 02/19/17 0704  I have reviewed the labs, culture data, radiology results, and diagnostic studies.    Results from last 7 days  Lab Units 02/19/17  0607 02/18/17  0609 02/17/17  0547 02/16/17  0812   SODIUM mmol/L 140 136 137 138   POTASSIUM mmol/L 3.9 3.9 4.1 4.1   CHLORIDE mmol/L 113* 112* 110* 105   TOTAL CO2 mmol/L 22.0 20.0 23.0 25.0   BUN mg/dL 16 21 26* 33*   CREATININE mg/dL 0.70 0.70 0.80 1.00   CALCIUM mg/dL 8.4* 8.1* 8.0* 9.3   BILIRUBIN mg/dL  --   --   --  0.4   ALK PHOS U/L  --   --   --  76   ALT (SGPT) U/L  --   --   --  10   AST (SGOT) U/L  --   --   --  17   GLUCOSE mg/dL 61* 73 58* 101*       Results from last 7 days  Lab Units 02/19/17  0607  02/18/17  0609 02/17/17  0547   WBC 10*3/mm3 4.88 5.16 6.93   HEMOGLOBIN g/dL 9.5* 8.9* 8.5*   HEMATOCRIT % 28.0* 27.4* 27.7*   PLATELETS 10*3/mm3 350 324 323           Culture Data:    Radiology Results:  Imaging Results (last 24 hours)     ** No results found for the last 24 hours. **        *. Please note that portions of this note were completed with a voice recognition program. Efforts were made to edit the dictations, but occasionally words are mistranscribed.  Nj Lloyd MD02/19/1712:19 PM

## 2017-02-19 NOTE — PLAN OF CARE
Problem: Sepsis (Adult)  Goal: Signs and Symptoms of Listed Potential Problems Will be Absent or Manageable (Sepsis)  Outcome: Ongoing (interventions implemented as appropriate)  Pt vitals are stable this shift.  She was turned frequently and heel dressing was changed.      02/19/17 0531   Sepsis   Problems Assessed (Sepsis) all   Problems Present (Sepsis) other (see comments)

## 2017-02-19 NOTE — PROGRESS NOTES
[facility] Medicine Services    ASSESSMENT / PLAN          1.Sepsis, + C diff colitis, Probable  UTI, possible pneumonia or infiltrates from last pneumonia, continue abx per ID along with po vancomycin+ Probiotic therapy , follow culturs, leukocytosis improving , afebrile today. Per nurses 2 loose stools overnight   2. Right heel and sacral decubitus ulcer , wound care following.   3. Chronic PVD, Hearing impaired, Anemia, Chronic right hip pain, stable  4. Bilateral lower extremity cellulitis, improving  5. Recent DVT, continue eliquis   6. Resolved  Severe sepsis, resolved lactic acidosis, Hypotension resolved,     7. Supportive care    Dementia with superimposed encephalopathy, improving      Length of Stay 2 Days   Code- FC  DVT Prophylaxis- on eliquis   Condition--serious  Prognosis-- guarded  Expected Discharge disposition in   2-3        Days to rehab      paliative care and id consult reviewed       SUBJECTIVE--HPI/ Events overnight / CC- Hospital Follow up visit/ ROS-not detailed ,  as performed below    Per patient she c/o right hip and leg pain , chronic per daughter     No vomitings  Does not use oxygen at [facility], currently room air  Per nurse two loose stools overnight     OBJECTIVE        Vital Sign Min/Max for last 24 hours  Temp  Min: 97.3 °F (36.3 °C)  Max: 99.5 °F (37.5 °C)   BP  Min: 126/64  Max: 149/97   Pulse  Min: 55  Max: 68   Resp  Min: 16  Max: 16   SpO2  Min: 90 %  Max: 99 %   No Data Recorded      Intake/Output Summary (Last 24 hours) at 02/18/17 1905  Last data filed at 02/18/17 1300   Gross per 24 hour   Intake    360 ml   Output      0 ml   Net    360 ml           Constitutional: no  Apparent distress awake  , follow simple commands today, moving extremities    Eyes:  sclerae anicteric, no conjunctival injection  Neck: supple, no thyromegaly, trachea midline  Respiratory: Clear to auscultation except left lower base reveals, nonlabored respirations    Cardiovascular: RRR, no murmurs, rubs, or gallops, palpable pedal pulses bilaterally  Gastrointestinal: Positive bowel sounds, soft, nontender, nondistended, no  suprapubic tenderness   Musculoskeletal:   lower extremity findings indicative of cellulitis with warmth and erythema and tenderness much improved over last 24 hrs   Psychiatric: oriented x 1, flat  affect, cooperative  Neurologic: Strength symmetric in all extremities,     Right heel showed the unstageable ulcer covered with the granulation tissue-examined today with removal of dressing   Sacral ulcer not examined today    Medications    Current Facility-Administered Medications:   •  acetaminophen (TYLENOL) suppository 650 mg, 650 mg, Rectal, Q4H PRN, Nj Lloyd MD  •  acetaminophen (TYLENOL) tablet 500 mg, 500 mg, Oral, Daily, Nj Lloyd MD, 500 mg at 02/18/17 0917  •  aluminum-magnesium hydroxide-simethicone (MAALOX/MYLANTA) suspension 30 mL, 30 mL, Oral, Q6H PRN, Nj Lloyd MD  •  apixaban (ELIQUIS) tablet 2.5 mg, 2.5 mg, Oral, Q12H, Nj Lloyd MD, 2.5 mg at 02/18/17 0917  •  bisacodyl (DULCOLAX) suppository 10 mg, 10 mg, Rectal, Daily, Nj Lloyd MD, 10 mg at 02/16/17 1446  •  dorzolamide-timolol (COSOPT) ophthalmic solution 1 drop, 1 drop, Both Eyes, BID, Nj Lloyd MD, 1 drop at 02/18/17 0917  •  doxycycline (VIBRAMYCIN) capsule 100 mg, 100 mg, Oral, Q12H, Edgardo Beatty MD, 100 mg at 02/18/17 0917  •  ipratropium-albuterol (DUO-NEB) nebulizer solution 3 mL, 3 mL, Nebulization, 4x Daily - RT, Nj Lloyd MD, 3 mL at 02/18/17 1642  •  magnesium hydroxide (MILK OF MAGNESIA) suspension 2400 mg/10mL 10 mL, 10 mL, Oral, Daily PRN, Nj Lloyd MD  •  magnesium sulfate in D5W 1g/100mL (PREMIX) IVPB 1 g, 1 g, Intravenous, PRN **OR** magnesium sulfate 8 g in dextrose (D5W) 5 % 250 mL infusion, 8 g, Intravenous, PRN **OR** magnesium sulfate 6 g in dextrose (D5W) 5 % 250 mL infusion, 6 g, Intravenous, PRN **OR** magnesium  sulfate in D5W 1g/100mL (PREMIX) IVPB 1 g, 1 g, Intravenous, PRN, Casie M Mayne, PA  •  nystatin (MYCOSTATIN) powder, , Topical, Q12H, Nj Lloyd MD  •  ondansetron (ZOFRAN) injection 4 mg, 4 mg, Intravenous, Q6H PRN, Nj Lloyd MD  •  piperacillin-tazobactam (ZOSYN) 3.375 g in dextrose 50 mL IVPB (premix), 3.375 g, Intravenous, Q6H, Nj Lloyd MD, 3.375 g at 02/18/17 1542  •  saccharomyces boulardii (FLORASTOR) capsule 250 mg, 250 mg, Oral, BID, Edgardo Beatty MD, 250 mg at 02/18/17 0917  •  sodium chloride 0.9 % flush 1-10 mL, 1-10 mL, Intravenous, PRN, Nj Lolyd MD  •  sodium chloride 0.9 % infusion, 125 mL/hr, Intravenous, Continuous, Nj Lloyd MD, Last Rate: 125 mL/hr at 02/17/17 0047, 125 mL/hr at 02/17/17 0047  •  traMADol (ULTRAM) tablet 50 mg, 50 mg, Oral, Q8H PRN, Nj Lloyd MD, 50 mg at 02/16/17 1057  •  vancomycin oral solution 125 mg, 125 mg, Oral, Q6H, Edgardo Beatty MD, 125 mg at 02/18/17 1427  I have reviewed the labs, culture data, radiology results, and diagnostic studies.    Results from last 7 days  Lab Units 02/18/17  0609 02/17/17  0547 02/16/17  0812   SODIUM mmol/L 136 137 138   POTASSIUM mmol/L 3.9 4.1 4.1   CHLORIDE mmol/L 112* 110* 105   TOTAL CO2 mmol/L 20.0 23.0 25.0   BUN mg/dL 21 26* 33*   CREATININE mg/dL 0.70 0.80 1.00   CALCIUM mg/dL 8.1* 8.0* 9.3   BILIRUBIN mg/dL  --   --  0.4   ALK PHOS U/L  --   --  76   ALT (SGPT) U/L  --   --  10   AST (SGOT) U/L  --   --  17   GLUCOSE mg/dL 73 58* 101*       Results from last 7 days  Lab Units 02/18/17  0609 02/17/17  0547 02/16/17  0812   WBC 10*3/mm3 5.16 6.93 13.66*   HEMOGLOBIN g/dL 8.9* 8.5* 9.7*   HEMATOCRIT % 27.4* 27.7* 30.7*   PLATELETS 10*3/mm3 324 323 387           Culture Data:    Radiology Results:  Imaging Results (last 24 hours)     ** No results found for the last 24 hours. **        *. Please note that portions of this note were completed with a voice recognition program. Efforts were made to  edit the dictations, but occasionally words are mistranscribed.  Nj Lloyd MD02/18/177:05 PM

## 2017-02-20 LAB
ANION GAP SERPL CALCULATED.3IONS-SCNC: 6 MMOL/L (ref 3–11)
BASOPHILS # BLD AUTO: 0.01 10*3/MM3 (ref 0–0.2)
BASOPHILS NFR BLD AUTO: 0.2 % (ref 0–1)
BUN BLD-MCNC: 11 MG/DL (ref 9–23)
BUN/CREAT SERPL: 15.7 (ref 7–25)
CALCIUM SPEC-SCNC: 8.8 MG/DL (ref 8.7–10.4)
CHLORIDE SERPL-SCNC: 111 MMOL/L (ref 99–109)
CO2 SERPL-SCNC: 24 MMOL/L (ref 20–31)
CREAT BLD-MCNC: 0.7 MG/DL (ref 0.6–1.3)
DEPRECATED RDW RBC AUTO: 49.8 FL (ref 37–54)
EOSINOPHIL # BLD AUTO: 0.44 10*3/MM3 (ref 0.1–0.3)
EOSINOPHIL NFR BLD AUTO: 9.3 % (ref 0–3)
ERYTHROCYTE [DISTWIDTH] IN BLOOD BY AUTOMATED COUNT: 14.8 % (ref 11.3–14.5)
GFR SERPL CREATININE-BSD FRML MDRD: 78 ML/MIN/1.73
GLUCOSE BLD-MCNC: 113 MG/DL (ref 70–100)
HCT VFR BLD AUTO: 30.1 % (ref 34.5–44)
HGB BLD-MCNC: 9.9 G/DL (ref 11.5–15.5)
IMM GRANULOCYTES # BLD: 0.02 10*3/MM3 (ref 0–0.03)
IMM GRANULOCYTES NFR BLD: 0.4 % (ref 0–0.6)
LYMPHOCYTES # BLD AUTO: 1.63 10*3/MM3 (ref 0.6–4.8)
LYMPHOCYTES NFR BLD AUTO: 34.5 % (ref 24–44)
MAGNESIUM SERPL-MCNC: 1.9 MG/DL (ref 1.3–2.7)
MCH RBC QN AUTO: 29.9 PG (ref 27–31)
MCHC RBC AUTO-ENTMCNC: 32.9 G/DL (ref 32–36)
MCV RBC AUTO: 90.9 FL (ref 80–99)
MONOCYTES # BLD AUTO: 0.46 10*3/MM3 (ref 0–1)
MONOCYTES NFR BLD AUTO: 9.7 % (ref 0–12)
NEUTROPHILS # BLD AUTO: 2.17 10*3/MM3 (ref 1.5–8.3)
NEUTROPHILS NFR BLD AUTO: 45.9 % (ref 41–71)
PLATELET # BLD AUTO: 385 10*3/MM3 (ref 150–450)
PMV BLD AUTO: 9.5 FL (ref 6–12)
POTASSIUM BLD-SCNC: 3.6 MMOL/L (ref 3.5–5.5)
RBC # BLD AUTO: 3.31 10*6/MM3 (ref 3.89–5.14)
SODIUM BLD-SCNC: 141 MMOL/L (ref 132–146)
WBC NRBC COR # BLD: 4.73 10*3/MM3 (ref 3.5–10.8)

## 2017-02-20 PROCEDURE — 83735 ASSAY OF MAGNESIUM: CPT | Performed by: FAMILY MEDICINE

## 2017-02-20 PROCEDURE — 85025 COMPLETE CBC W/AUTO DIFF WBC: CPT | Performed by: FAMILY MEDICINE

## 2017-02-20 PROCEDURE — 94799 UNLISTED PULMONARY SVC/PX: CPT

## 2017-02-20 PROCEDURE — 80048 BASIC METABOLIC PNL TOTAL CA: CPT | Performed by: FAMILY MEDICINE

## 2017-02-20 PROCEDURE — 94760 N-INVAS EAR/PLS OXIMETRY 1: CPT

## 2017-02-20 PROCEDURE — 25010000002 PIPERACILLIN SOD-TAZOBACTAM PER 1 G: Performed by: FAMILY MEDICINE

## 2017-02-20 PROCEDURE — 94640 AIRWAY INHALATION TREATMENT: CPT

## 2017-02-20 PROCEDURE — 99233 SBSQ HOSP IP/OBS HIGH 50: CPT | Performed by: INTERNAL MEDICINE

## 2017-02-20 RX ADMIN — IPRATROPIUM BROMIDE AND ALBUTEROL SULFATE 3 ML: .5; 3 SOLUTION RESPIRATORY (INHALATION) at 12:22

## 2017-02-20 RX ADMIN — Medication 125 MG: at 23:54

## 2017-02-20 RX ADMIN — TAZOBACTAM SODIUM AND PIPERACILLIN SODIUM 3.38 G: 375; 3 INJECTION, SOLUTION INTRAVENOUS at 13:52

## 2017-02-20 RX ADMIN — DOXYCYCLINE HYCLATE 100 MG: 100 CAPSULE, GELATIN COATED ORAL at 09:18

## 2017-02-20 RX ADMIN — TAZOBACTAM SODIUM AND PIPERACILLIN SODIUM 3.38 G: 375; 3 INJECTION, SOLUTION INTRAVENOUS at 02:20

## 2017-02-20 RX ADMIN — NYSTATIN: 100000 POWDER TOPICAL at 20:52

## 2017-02-20 RX ADMIN — NYSTATIN: 100000 POWDER TOPICAL at 09:54

## 2017-02-20 RX ADMIN — ACETAMINOPHEN 500 MG: 500 TABLET ORAL at 09:18

## 2017-02-20 RX ADMIN — Medication 125 MG: at 13:52

## 2017-02-20 RX ADMIN — TAZOBACTAM SODIUM AND PIPERACILLIN SODIUM 3.38 G: 375; 3 INJECTION, SOLUTION INTRAVENOUS at 09:54

## 2017-02-20 RX ADMIN — DEXTROSE AND SODIUM CHLORIDE 75 ML/HR: 5; 450 INJECTION, SOLUTION INTRAVENOUS at 00:33

## 2017-02-20 RX ADMIN — TAZOBACTAM SODIUM AND PIPERACILLIN SODIUM 3.38 G: 375; 3 INJECTION, SOLUTION INTRAVENOUS at 20:51

## 2017-02-20 RX ADMIN — DORZOLAMIDE HYDROCHLORIDE AND TIMOLOL MALEATE 1 DROP: 20; 5 SOLUTION/ DROPS OPHTHALMIC at 18:28

## 2017-02-20 RX ADMIN — IPRATROPIUM BROMIDE AND ALBUTEROL SULFATE 3 ML: .5; 3 SOLUTION RESPIRATORY (INHALATION) at 08:32

## 2017-02-20 RX ADMIN — APIXABAN 2.5 MG: 2.5 TABLET, FILM COATED ORAL at 09:18

## 2017-02-20 RX ADMIN — APIXABAN 2.5 MG: 2.5 TABLET, FILM COATED ORAL at 20:51

## 2017-02-20 RX ADMIN — DORZOLAMIDE HYDROCHLORIDE AND TIMOLOL MALEATE 1 DROP: 20; 5 SOLUTION/ DROPS OPHTHALMIC at 09:49

## 2017-02-20 RX ADMIN — Medication 125 MG: at 18:28

## 2017-02-20 RX ADMIN — IPRATROPIUM BROMIDE AND ALBUTEROL SULFATE 3 ML: .5; 3 SOLUTION RESPIRATORY (INHALATION) at 15:59

## 2017-02-20 RX ADMIN — Medication 250 MG: at 09:18

## 2017-02-20 RX ADMIN — Medication 125 MG: at 00:31

## 2017-02-20 RX ADMIN — Medication 250 MG: at 18:28

## 2017-02-20 RX ADMIN — DEXTROSE AND SODIUM CHLORIDE 75 ML/HR: 5; 450 INJECTION, SOLUTION INTRAVENOUS at 13:57

## 2017-02-20 RX ADMIN — IPRATROPIUM BROMIDE AND ALBUTEROL SULFATE 3 ML: .5; 3 SOLUTION RESPIRATORY (INHALATION) at 19:41

## 2017-02-20 NOTE — SIGNIFICANT NOTE
Palliative Team Meeting Attendance  13:00  CARLEY Youngblood RN, CHPN              DO GEORGES Gaytan, [facility], [facility]-              WILLIAM Velasco M.Div., [facility], [facility]   YAMINI Bryan, RN, PN

## 2017-02-20 NOTE — PLAN OF CARE
Problem: Patient Care Overview (Adult)  Goal: Plan of Care Review  Outcome: Ongoing (interventions implemented as appropriate)    02/20/17 0356   Coping/Psychosocial Response Interventions   Plan Of Care Reviewed With patient   Patient Care Overview   Progress no change   Outcome Evaluation   Outcome Summary/Follow up Plan Pt. [place] on the left side; IV Abx. therapy continued; Pt. hesitant about taking her medication, but did agree to take her pills crushed in applesauce; VSS.          Problem: Pressure Ulcer (Adult)  Goal: Signs and Symptoms of Listed Potential Problems Will be Absent or Manageable (Pressure Ulcer)  Outcome: Ongoing (interventions implemented as appropriate)    Problem: Skin Integrity Impairment, Risk/Actual (Adult)  Goal: Identify Related Risk Factors and Signs and Symptoms  Outcome: Ongoing (interventions implemented as appropriate)  Goal: Skin Integrity/Wound Healing  Outcome: Ongoing (interventions implemented as appropriate)

## 2017-02-20 NOTE — PROGRESS NOTES
Continued Stay Note  [facility]     Patient Name: Joana Dixon  MRN: [id number]  Today's Date: 2/20/2017    Admit Date: 2/16/2017          Discharge Plan       02/20/17 1427    Case Management/Social Work Plan    Plan [facility]    Patient/Family In Agreement With Plan yes    Additional Comments Spoke with pt and pt's daughter at bedside. Pt's daughter reports that pt plans to go back to [facility] when medically ready. GARRETT spoke with Ivette at [facility] and Ivette reports that they do have th ebed hold, howver, with pt being positive for C. Diff now, she will need a private room and they don't have one avilable for her right now. Ivette reported she iwll let GARRETT know when a bed is available for pt and hoping to have one by Wednesday.    Final Note    Final Note GARRETT is following              Discharge Codes     None        Expected Discharge Date and Time     Expected Discharge Date Expected Discharge Time    Feb 20, 2017             CHEL Lewis

## 2017-02-20 NOTE — PROGRESS NOTES
"      HOSPITALIST DAILY PROGRESS NOTE    Chief Complaint: weakness    Subjective   SUBJECTIVE/OVERNIGHT EVENTS   Lying in bed. Daughter at bedside. Overall doing better. Not eating much. Still complaining of right leg pain.     Review of Systems:  Gen-no fevers, no chills  CV-no chest pain, no palpitations  Resp-no cough, no dyspnea  GI-no N/V/D, no abd pain    Objective   OBJECTIVE   I have reviewed the vital signs.  Visit Vitals   • /75 (BP Location: Left arm, Patient Position: Lying)   • Pulse (!) 49   • Temp 98.2 °F (36.8 °C) (Axillary)   • Resp 16   • Ht 62\" (157.5 cm)   • Wt 146 lb 3.2 oz (66.3 kg)   • SpO2 90%   • BMI 26.74 kg/m2       Physical Exam:  Gen-no acute distress, chronically ill appearing.  HEENT-poor dentition  CV-verito, regular, no murmur  Resp-CTAB, no wheezes  Abd-soft, NT, ND, +BS  Ext-no edema  Neuro-Alert and interactive. Moves extremities.  Psych-pleasant    Results:  I have reviewed the labs, culture data, radiology results, and diagnostic studies.      Results from last 7 days  Lab Units 02/20/17  0844 02/19/17  0607 02/18/17  0609   WBC 10*3/mm3 4.73 4.88 5.16   HEMOGLOBIN g/dL 9.9* 9.5* 8.9*   HEMATOCRIT % 30.1* 28.0* 27.4*   PLATELETS 10*3/mm3 385 350 324       Results from last 7 days  Lab Units 02/20/17  0844   SODIUM mmol/L 141   POTASSIUM mmol/L 3.6   CHLORIDE mmol/L 111*   TOTAL CO2 mmol/L 24.0   BUN mg/dL 11   CREATININE mg/dL 0.70   GLUCOSE mg/dL 113*   CALCIUM mg/dL 8.8       Culture Data:  Cultures:    BLOOD CULTURE   Date Value Ref Range Status   02/16/2017 Staphylococcus, coagulase negative (A)  Preliminary     Comment:     Susceptibility will not be done unless Doctor notifies Lab  Suggests contamination     02/16/2017 No growth at 4 days  Preliminary     URINE CULTURE   Date Value Ref Range Status   02/16/2017 >100,000 CFU/mL Proteus mirabilis (A)  Final         Radiology Results: Personally reviewed CXR with left sided airspace disease. I agree with " interpretation.      I have reviewed the medications.      Assessment/Plan   ASSESSMENT/PLAN    Principal Problem:    Severe sepsis  Active Problems:    Chronic pain    UTI (urinary tract infection)    HCAP (healthcare-associated pneumonia)    PVD (peripheral vascular disease)    Hearing impaired    Lactic acid acidosis    Cellulitis    Plan:  --Patient improving clinically on current antibiotic regimen. ID following. Has completed antibiotics for UTI.  --Continue vancomycin taper at 125 mg by mouth every 6 hours for one week with reduction in weekly dosing over the next month in addition to probiotic therapy with Florastor 250 mg by mouth twice a day.  --Hopefully back to NH in am.    Zully Ferrara II, DO  02/20/17  1:57 PM

## 2017-02-20 NOTE — PROGRESS NOTES
Joana Dixon  9/24/1923  [id number]  2/20/2017    CC: Bilateral lower extremity cellulitis.    Joana Dixon is a 89 y.o. female here for persistent left lower lobe pulmonary infiltrate, urinary tract infection, and new diagnosis of Clostridium difficile diarrhea..         Past medical history:  Past Medical History   Diagnosis Date   • Anemia    • Arthritis    • Chronic kidney disease (CKD), stage III (moderate)    • Chronic pain      right hip   • Confusion    • Dehydration    • Dementia    • Glaucoma    • H/O urinary frequency    • Hypertension    • Insomnia    • Macular degeneration    • PVD (peripheral vascular disease)      wears compression stockings   • Renal disorder    • Upper respiratory infection        Medications:   Current Facility-Administered Medications:   •  acetaminophen (TYLENOL) suppository 650 mg, 650 mg, Rectal, Q4H PRN, Nj Lloyd MD  •  acetaminophen (TYLENOL) tablet 500 mg, 500 mg, Oral, Daily, Nj Lloyd MD, 500 mg at 02/20/17 0918  •  aluminum-magnesium hydroxide-simethicone (MAALOX/MYLANTA) suspension 30 mL, 30 mL, Oral, Q6H PRN, Nj Lloyd MD  •  apixaban (ELIQUIS) tablet 2.5 mg, 2.5 mg, Oral, Q12H, Nj Lloyd MD, 2.5 mg at 02/20/17 0918  •  bisacodyl (DULCOLAX) suppository 10 mg, 10 mg, Rectal, Daily, Nj Lloyd MD, 10 mg at 02/16/17 1446  •  dextrose 5 % and sodium chloride 0.45 % infusion, 75 mL/hr, Intravenous, Continuous, Nj Lloyd MD, Last Rate: 75 mL/hr at 02/20/17 0033, 75 mL/hr at 02/20/17 0033  •  dorzolamide-timolol (COSOPT) ophthalmic solution 1 drop, 1 drop, Both Eyes, BID, Nj Lloyd MD, 1 drop at 02/20/17 0949  •  doxycycline (VIBRAMYCIN) capsule 100 mg, 100 mg, Oral, Q12H, Edgardo Beatty MD, 100 mg at 02/20/17 0918  •  ipratropium-albuterol (DUO-NEB) nebulizer solution 3 mL, 3 mL, Nebulization, 4x Daily - RT, Nj Lloyd MD, 3 mL at 02/20/17 0832  •  magnesium hydroxide (MILK OF MAGNESIA) suspension 2400 mg/10mL 10 mL, 10 mL, Oral,  Daily PRN, Nj Lloyd MD  •  magnesium sulfate in D5W 1g/100mL (PREMIX) IVPB 1 g, 1 g, Intravenous, PRN **OR** magnesium sulfate 8 g in dextrose (D5W) 5 % 250 mL infusion, 8 g, Intravenous, PRN **OR** magnesium sulfate 6 g in dextrose (D5W) 5 % 250 mL infusion, 6 g, Intravenous, PRN **OR** magnesium sulfate in D5W 1g/100mL (PREMIX) IVPB 1 g, 1 g, Intravenous, PRN, Casie M Mayne, PA  •  nystatin (MYCOSTATIN) powder, , Topical, Q12H, Nj Lloyd MD  •  ondansetron (ZOFRAN) injection 4 mg, 4 mg, Intravenous, Q6H PRN, Nj Lloyd MD  •  piperacillin-tazobactam (ZOSYN) 3.375 g in dextrose 50 mL IVPB (premix), 3.375 g, Intravenous, Q6H, Nj Lloyd MD, 3.375 g at 02/20/17 0954  •  saccharomyces boulardii (FLORASTOR) capsule 250 mg, 250 mg, Oral, BID, Edgardo Beatty MD, 250 mg at 02/20/17 0918  •  sodium chloride 0.9 % flush 1-10 mL, 1-10 mL, Intravenous, PRN, Nj Lloyd MD  •  traMADol (ULTRAM) tablet 50 mg, 50 mg, Oral, Q8H PRN, Nj Lloyd MD, 50 mg at 02/16/17 1057  •  vancomycin oral solution 125 mg, 125 mg, Oral, Q6H, Edgardo Beatty MD, 125 mg at 02/20/17 0031  Antibiotics:  IV Anti-Infectives     Ordered     Dose/Rate Route Frequency Start Stop    02/17/17 0725  vancomycin oral solution 125 mg     Ordering Provider:  Edgardo Beatty MD    125 mg Oral Every 6 Hours Scheduled 02/17/17 0800      02/16/17 1509  doxycycline (VIBRAMYCIN) capsule 100 mg     Ordering Provider:  Edgardo Beatty MD    100 mg Oral Every 12 Hours Scheduled 02/16/17 2100      02/16/17 1036  piperacillin-tazobactam (ZOSYN) 3.375 g in dextrose 50 mL IVPB (premix)     Ordering Provider:  Nj Lloyd MD    3.375 g  over 0.5 Hours Intravenous Every 6 Hours 02/16/17 1400      02/16/17 0744  vancomycin (VANCOCIN) IVPB 1 g (premix) in Dextrose 5% 200 mL     Ordering Provider:  Travis Nielsen MD    20 mg/kg × 49.9 kg  over 60 Minutes Intravenous Once 02/16/17 0830 02/16/17 1002    02/16/17 0744   "piperacillin-tazobactam (ZOSYN) 3.375 g in dextrose 50 mL IVPB (premix)     Ordering Provider:  Travis Nielsen MD    3.375 g  over 0.5 Hours Intravenous Once 02/16/17 0800 02/16/17 0900          Allergies:  has No Known Allergies.    Review of Systems: All other reviewed and negative except as per HPI    Blood pressure 161/93, pulse 55, temperature 97.9 °F (36.6 °C), temperature source Oral, resp. rate 24, height 62\" (157.5 cm), weight 146 lb 3.2 oz (66.3 kg), SpO2 95 %.  GENERAL: Sleeping soundly.  Difficult to arouse.  Moans with clinical examination.  Quite hard of hearing.  HEENT: Oropharynx without thrush. Sinuses nontender. Dentition in poor repair. No cervical adenopathy. No carotid bruits/ jugular venous distention.   EYES: PERRL. No conjunctival injection. No icterus. EOMI.  LYMPHATICS: No lymphadenopathy of the neck or axillary or inguinal regions.   HEART: 2/6 aortic sclerosis murmur.   LUNGS: Clear to auscultation anteriorly. No percussion dullness.   ABDOMEN: Soft, nontender, nondistended. No appreciable HSM.    SKIN: Warm and dry without cutaneous eruptions. No embolic stigmata.  Complete resolution of erythema on bilateral anterior tibial surfaces.  PSYCHIATRIC: Baseline dementia with disorientation and intermittent agitation.      DIAGNOSTICS:  Lab Results   Component Value Date    WBC 4.73 02/20/2017    HGB 9.9 (L) 02/20/2017    HCT 30.1 (L) 02/20/2017     02/20/2017     No results found for: CRP  No results found for: SEDRATE  Lab Results   Component Value Date    GLUCOSE 113 (H) 02/20/2017    BUN 11 02/20/2017    CREATININE 0.70 02/20/2017    EGFRIFNONA 78 02/20/2017    BCR 15.7 02/20/2017    CO2 24.0 02/20/2017    CALCIUM 8.8 02/20/2017    ALBUMIN 3.60 02/16/2017    LABIL2 1.1 (L) 02/16/2017    AST 17 02/16/2017    ALT 10 02/16/2017       Microbiology: One of 2 admission blood cultures with coagulase-negative staphylococci/likely cutaneous contaminant.  Clostridium difficile toxin assay " positive by PCR.  Urine with greater than 100,000 colony-forming units of Proteus species, resistant to tetracycline and intermittent intermediate to nitrofurantoin.      RADIOLOGY:  Imaging Results (last 72 hours)     ** No results found for the last 72 hours. **          Assessment and Plan: Severe sepsis.  Hypotension.  Lactic acidosis.  Acute kidney injury.  Non-resolving left lower lobe pneumonia.  Proteus urinary tract infection.  Bilateral lower extremity cellulitis.  Clostridium difficile diarrhea.  Maximum temperature over 24 hours 97.9°.  Currently same.  Peripheral leukocyte count is down to 4900.  Plasma creatinine is 0.7.  The patient's lactic acidosis has resolved.  One of 2 admission blood cultures with coagulase-negative staphylococci/suspected cutaneous contaminant.  Proteus urinary tract infection.  Utilization management: Does not appear to be clinically toxic.  Acceptable for return to nursing home.  Bilateral lower extremity cellulitis has resolved.  Adequate course of therapy for gram-negative urinary tract infection.  Would suggest vancomycin taper at 125 mg by mouth every 6 hours for one week with reduction in weekly dosing over the next month in addition to probiotic therapy with Florastor 250 mg by mouth twice a day.  Discussed with adult daughter at length.  Follow-up with me in 2 weeks.  High risk for relapse of Clostridium difficile disease quoted at 25%.  May require healthy donor fecal transplantation in that clinical setting.      Edgardo Beatty MD  2/20/2017

## 2017-02-20 NOTE — PROGRESS NOTES
"Palliative Care Progress Note    Date of Admission: 2/16/2017    Subjective:  Patient remains somewhat confused only thing help me helped me when asked questions.  Whenever asked what she needs help with she does not apply.  No current facility-administered medications on file prior to encounter.      Current Outpatient Prescriptions on File Prior to Encounter   Medication Sig Dispense Refill   • acetaminophen (TYLENOL) 500 MG tablet Take 500 mg by mouth Daily.     • acetaminophen (TYLENOL) 650 MG suppository Insert 650 mg into the rectum Every 4 (Four) Hours As Needed for mild pain (1-3).     • amLODIPine (NORVASC) 2.5 MG tablet Take 1 tablet by mouth Daily. 30 tablet 0   • apixaban (ELIQUIS) 5 MG tablet tablet Take 1 tablet by mouth Every 12 (Twelve) Hours. 60 tablet 0   • castor oil-balsam peru (VENELEX) ointment Apply 1 application topically Every 12 (Twelve) Hours. 30 g 0   • diclofenac (VOLTAREN) 1 % gel gel Apply 4 g topically Daily As Needed. TO HANDS      • dorzolamide-timolol (COSOPT) 22.3-6.8 MG/ML ophthalmic solution Administer 1 drop to both eyes 2 (Two) Times a Day.     • ipratropium-albuterol (DUO-NEB) 0.5-2.5 mg/mL nebulizer Take 3 mL by nebulization 4 (Four) Times a Day.     • nystatin (MYCOSTATIN) 084740 UNIT/GM powder Apply  topically Every 12 (Twelve) Hours. 15 g 0       dextrose 5 % and sodium chloride 0.45 % 75 mL/hr Last Rate: 75 mL/hr (02/20/17 1357)     •  acetaminophen  •  aluminum-magnesium hydroxide-simethicone  •  magnesium hydroxide  •  magnesium sulfate in D5W 1g/100mL (PREMIX) **OR** magnesium sulfate bolus in 250 mL **OR** magnesium sulfate bolus in 250 mL **OR** magnesium sulfate in D5W 1g/100mL (PREMIX)  •  ondansetron  •  sodium chloride  •  traMADol    Objective:   Visit Vitals   • /88 (BP Location: Left arm, Patient Position: Lying)   • Pulse 54   • Temp 98.3 °F (36.8 °C) (Axillary)   • Resp 16   • Ht 62\" (157.5 cm)   • Wt 146 lb 3.2 oz (66.3 kg)   • SpO2 (!) 84%   • BMI " 26.74 kg/m2        Intake/Output Summary (Last 24 hours) at 02/20/17 1448  Last data filed at 02/20/17 1300   Gross per 24 hour   Intake   1220 ml   Output      0 ml   Net   1220 ml     Physical Exam:      General Appearance:    asleep, arousable, confused    Head:    Normocephalic, without obvious abnormality, atraumatic   Eyes:            Lids and lashes normal, conjunctivae and sclerae normal, no   icterus, no pallor, corneas clear, PERRLA   Ears:    Ears appear intact with no abnormalities noted   Throat:   No oral lesions, no thrush, oral mucosa moist   Neck:   No adenopathy, supple, trachea midline, no thyromegaly, no     carotid bruit, no JVD   Back:     No kyphosis present, no scoliosis present, no skin lesions,       erythema or scars, no tenderness to percussion or                   palpation,   range of motion normal   Lungs:     diminished breath sounds throughout     Heart:    Regular rhythm and normal rate, normal S1 and S2, no            murmur, no gallop, no rub, no click   Breast Exam:    Deferred   Abdomen:     Normal bowel sounds, no masses, no organomegaly, soft        non-tender, non-distended, no guarding, no rebound                 tenderness   Genitalia:    Deferred   Extremities:   Moves all extremities well, no edema, no cyanosis, no              redness   Pulses:   Pulses palpable and equal bilaterally   Skin:   No bleeding, bruising or rash   Lymph nodes:   No palpable adenopathy           Results from last 7 days  Lab Units 02/20/17  0844   WBC 10*3/mm3 4.73   HEMOGLOBIN g/dL 9.9*   HEMATOCRIT % 30.1*   PLATELETS 10*3/mm3 385       Results from last 7 days  Lab Units 02/20/17  0844  02/16/17  0812   SODIUM mmol/L 141  < > 138   POTASSIUM mmol/L 3.6  < > 4.1   CHLORIDE mmol/L 111*  < > 105   TOTAL CO2 mmol/L 24.0  < > 25.0   BUN mg/dL 11  < > 33*   CREATININE mg/dL 0.70  < > 1.00   CALCIUM mg/dL 8.8  < > 9.3   BILIRUBIN mg/dL  --   --  0.4   ALK PHOS U/L  --   --  76   ALT (SGPT) U/L  --    --  10   AST (SGOT) U/L  --   --  17   GLUCOSE mg/dL 113*  < > 101*   < > = values in this interval not displayed.    Impression: Sepsis POA  UTI POA  Agitation  Goals of care  Plan: This point, did have discussion with the patient's daughter again who is at bedside.  We discussed CODE STATUS and ultimately she does agree with DNR.  She does state that she will continue to come back to the hospital with the patient has future episodes similar to what brought her into the hospital so discussing palliative involved at the nursing facility.        Víctor Stovall,   02/20/17  2:48 PM

## 2017-02-21 LAB
BACTERIA SPEC AEROBE CULT: ABNORMAL
BACTERIA SPEC AEROBE CULT: NORMAL
GRAM STN SPEC: ABNORMAL
ISOLATED FROM: ABNORMAL

## 2017-02-21 PROCEDURE — 94760 N-INVAS EAR/PLS OXIMETRY 1: CPT

## 2017-02-21 PROCEDURE — 94640 AIRWAY INHALATION TREATMENT: CPT

## 2017-02-21 PROCEDURE — 99232 SBSQ HOSP IP/OBS MODERATE 35: CPT | Performed by: INTERNAL MEDICINE

## 2017-02-21 PROCEDURE — 25010000002 PIPERACILLIN SOD-TAZOBACTAM PER 1 G: Performed by: FAMILY MEDICINE

## 2017-02-21 PROCEDURE — 94799 UNLISTED PULMONARY SVC/PX: CPT

## 2017-02-21 RX ORDER — AMLODIPINE BESYLATE 2.5 MG/1
2.5 TABLET ORAL
Status: DISCONTINUED | OUTPATIENT
Start: 2017-02-21 | End: 2017-02-22

## 2017-02-21 RX ADMIN — TAZOBACTAM SODIUM AND PIPERACILLIN SODIUM 3.38 G: 375; 3 INJECTION, SOLUTION INTRAVENOUS at 01:44

## 2017-02-21 RX ADMIN — ACETAMINOPHEN 500 MG: 500 TABLET ORAL at 09:08

## 2017-02-21 RX ADMIN — APIXABAN 2.5 MG: 2.5 TABLET, FILM COATED ORAL at 09:08

## 2017-02-21 RX ADMIN — NYSTATIN: 100000 POWDER TOPICAL at 09:08

## 2017-02-21 RX ADMIN — NYSTATIN: 100000 POWDER TOPICAL at 21:13

## 2017-02-21 RX ADMIN — AMLODIPINE BESYLATE 2.5 MG: 2.5 TABLET ORAL at 15:41

## 2017-02-21 RX ADMIN — Medication 250 MG: at 09:08

## 2017-02-21 RX ADMIN — DORZOLAMIDE HYDROCHLORIDE AND TIMOLOL MALEATE 1 DROP: 20; 5 SOLUTION/ DROPS OPHTHALMIC at 09:08

## 2017-02-21 RX ADMIN — Medication 250 MG: at 21:13

## 2017-02-21 RX ADMIN — Medication 125 MG: at 17:59

## 2017-02-21 RX ADMIN — IPRATROPIUM BROMIDE AND ALBUTEROL SULFATE 3 ML: .5; 3 SOLUTION RESPIRATORY (INHALATION) at 16:32

## 2017-02-21 RX ADMIN — DORZOLAMIDE HYDROCHLORIDE AND TIMOLOL MALEATE 1 DROP: 20; 5 SOLUTION/ DROPS OPHTHALMIC at 17:59

## 2017-02-21 RX ADMIN — APIXABAN 2.5 MG: 2.5 TABLET, FILM COATED ORAL at 21:13

## 2017-02-21 RX ADMIN — IPRATROPIUM BROMIDE AND ALBUTEROL SULFATE 3 ML: .5; 3 SOLUTION RESPIRATORY (INHALATION) at 13:14

## 2017-02-21 RX ADMIN — IPRATROPIUM BROMIDE AND ALBUTEROL SULFATE 3 ML: .5; 3 SOLUTION RESPIRATORY (INHALATION) at 21:26

## 2017-02-21 RX ADMIN — Medication 125 MG: at 05:53

## 2017-02-21 RX ADMIN — IPRATROPIUM BROMIDE AND ALBUTEROL SULFATE 3 ML: .5; 3 SOLUTION RESPIRATORY (INHALATION) at 09:15

## 2017-02-21 RX ADMIN — Medication 125 MG: at 13:40

## 2017-02-21 NOTE — SIGNIFICANT NOTE
Palliative Team Meeting Attendance  13:00  CARLEY Youngblood RN, CHPN              MARCY Stovall, DO TRUMAN Holguin, YAMINI Middleton, RN, CHKELLEE Vieyra, [facility], [facility]              WILLIAM Sarmiento, RN, CHPN

## 2017-02-21 NOTE — PROGRESS NOTES
"Adult Nutrition  Assessment/PES    Patient Name:  Joana Dixon  YOB: 1923  MRN: [id number]  Admit Date:  2/16/2017    Assessment Date:  2/21/2017        Reason for Assessment       02/21/17 1432    Reason for Assessment    Reason For Assessment/Visit follow up protocol    Time Spent (min) 30    Cardiac PVD    Renal CKD   Stage 3              Nutrition/Diet History       02/21/17 1433    Nutrition/Diet History    Reported/Observed By Patient;Family    Other Patient asleep at time of visit. Per daughter, patients appetite has declined; daughter is an RD, believes medication has caused decrease in appetite. Patients last substantial meal was Sunday 2/19. Patient drinks boost occasionally; no breakfast this AM, only had sips of boost for bfast. Patient tolerates soups well.            Anthropometrics       02/21/17 1435    Anthropometrics    Height 157.5 cm (62\")    Weight 66.3 kg (146 lb 2.6 oz)    Ideal Body Weight (IBW)    Ideal Body Weight (IBW), Female 50.83    % Ideal Body Weight 130.7    Body Mass Index (BMI)    BMI (kg/m2) 26.79            Labs/Tests/Procedures/Meds       02/21/17 1436    Labs/Tests/Procedures/Meds    Labs/Tests Review Reviewed                Nutrition Prescription Ordered       02/21/17 1436    Nutrition Prescription PO    Current PO Diet Soft Texture    Texture Ground    Supplement Ensure HP    Supplement Frequency 3 times a day    Common Modifiers Cardiac            Evaluation of Received Nutrient/Fluid Intake       02/21/17 1436    PO Evaluation    Number of Meals 4    % PO Intake 0%   sips of boost plus              Problem/Interventions:        Problem 1       02/21/17 1436    Nutrition Diagnoses Problem 1    Problem 1 Inadequate Intake/Infusion    Inadequate Intake Type Oral    Etiology (related to) Medical Diagnosis   clinical condition    Signs/Symptoms (evidenced by) PO Intake    Percent (%) intake recorded 0 %   sips of boost supplements    Over number of meals 4    "                 Intervention Goal       02/21/17 1437    Intervention Goal    General Nutrition support treatment    PO Increase intake            Nutrition Intervention       02/21/17 1437    Nutrition Intervention    RD/Tech Action Advise alternate selection;Encourage intake;Follow Tx progress;Care plan reviewd              Education/Evaluation       02/21/17 1437    Monitor/Evaluation    Monitor Per protocol;PO intake;Supplement intake          Electronically signed by:  Amber Ambrosio  02/21/17 2:38 PM

## 2017-02-21 NOTE — PROGRESS NOTES
"      HOSPITALIST DAILY PROGRESS NOTE    Chief Complaint: leg pain    Subjective   SUBJECTIVE/OVERNIGHT EVENTS   Lying in bed. No problems overnight. Still complaining of leg burning. Not eating well.    Review of Systems:  Gen-no fevers, no chills  CV-no chest pain, no palpitations  Resp-no cough, no dyspnea  GI-no N/V/D, no abd pain    Objective   OBJECTIVE   I have reviewed the vital signs.  Visit Vitals   • BP (!) 175/102 (BP Location: Left arm, Patient Position: Lying)   • Pulse 52   • Temp 96.3 °F (35.7 °C) (Oral)   • Resp 16   • Ht 62\" (157.5 cm)   • Wt 146 lb 3.2 oz (66.3 kg)   • SpO2 96%   • BMI 26.74 kg/m2       Physical Exam:  Gen-no acute distress, resting comfortably  HEENT-poor dentition  CV-RRR, S1 S2 normal, no m/r/g  Resp-CTAB, no wheezes  Abd-soft, NT, ND, +BS  Ext-bilateral changes c/w chronic venous stasis, varicosities noted on right. Redness continuing to improve.  Neuro-Alert and conversant. Moves all extremities.  Psych-appropriate mood    Results:  I have reviewed the labs, culture data, radiology results, and diagnostic studies.      Results from last 7 days  Lab Units 02/20/17  0844 02/19/17  0607 02/18/17  0609   WBC 10*3/mm3 4.73 4.88 5.16   HEMOGLOBIN g/dL 9.9* 9.5* 8.9*   HEMATOCRIT % 30.1* 28.0* 27.4*   PLATELETS 10*3/mm3 385 350 324       Results from last 7 days  Lab Units 02/20/17  0844   SODIUM mmol/L 141   POTASSIUM mmol/L 3.6   CHLORIDE mmol/L 111*   TOTAL CO2 mmol/L 24.0   BUN mg/dL 11   CREATININE mg/dL 0.70   GLUCOSE mg/dL 113*   CALCIUM mg/dL 8.8       Culture Data:  Cultures:    BLOOD CULTURE   Date Value Ref Range Status   02/16/2017 Staphylococcus, coagulase negative (A)  Final     Comment:     Susceptibility will not be done unless Doctor notifies Lab  Suggests contamination     02/16/2017 No growth at 5 days  Final     URINE CULTURE   Date Value Ref Range Status   02/16/2017 >100,000 CFU/mL Proteus mirabilis (A)  Final       I have reviewed the " medications.      Assessment/Plan   ASSESSMENT/PLAN    Principal Problem:    Severe sepsis  Active Problems:    Chronic pain    UTI (urinary tract infection)    HCAP (healthcare-associated pneumonia)    PVD (peripheral vascular disease)    Hearing impaired    Lactic acid acidosis    Cellulitis    Plan:  --Add amlodipine for bp. Stop IVF.  --Patient improving clinically on current antibiotic regimen. ID following. Has completed antibiotics for UTI.  --Continue vancomycin taper at 125 mg by mouth every 6 hours for one week with reduction in weekly dosing over the next month in addition to probiotic therapy with Florastor 250 mg by mouth twice a day.  --Hopefully back to NH once bed available.    Zully Ferrara II, DO  02/21/17  1:49 PM

## 2017-02-21 NOTE — PLAN OF CARE
Problem: Patient Care Overview (Adult)  Goal: Plan of Care Review  Outcome: Ongoing (interventions implemented as appropriate)    02/21/17 0440   Coping/Psychosocial Response Interventions   Plan Of Care Reviewed With patient   Patient Care Overview   Progress no change   Outcome Evaluation   Outcome Summary/Follow up Plan Pt. initially refusing her evening medication- daughter was still present and encouraged her mother to take the medication; IV Abx. therapy continued; Pt. sats remain above 90% on room air; Pt. was bradycardic during the night; calling out for help often.         Problem: Skin Integrity Impairment, Risk/Actual (Adult)  Goal: Identify Related Risk Factors and Signs and Symptoms  Outcome: Ongoing (interventions implemented as appropriate)  Goal: Skin Integrity/Wound Healing  Outcome: Ongoing (interventions implemented as appropriate)

## 2017-02-21 NOTE — PROGRESS NOTES
Joana Dixon  9/24/1923  [id number]  2/21/2017    CC: Severe sepsis, hypotension, and lactic acidosis.    Joana Dixon is a 89 y.o. female here for lateral lower extremity cellulitis, negative urinary tract infection, persistent left lower lobe pulmonary infiltrate, and positive Clostridium difficile toxin.         Past medical history:  Past Medical History   Diagnosis Date   • Anemia    • Arthritis    • Chronic kidney disease (CKD), stage III (moderate)    • Chronic pain      right hip   • Confusion    • Dehydration    • Dementia    • Glaucoma    • H/O urinary frequency    • Hypertension    • Insomnia    • Macular degeneration    • PVD (peripheral vascular disease)      wears compression stockings   • Renal disorder    • Upper respiratory infection        Medications:   Current Facility-Administered Medications:   •  acetaminophen (TYLENOL) suppository 650 mg, 650 mg, Rectal, Q4H PRN, Nj Lloyd MD  •  acetaminophen (TYLENOL) tablet 500 mg, 500 mg, Oral, Daily, Nj Lloyd MD, 500 mg at 02/20/17 0918  •  aluminum-magnesium hydroxide-simethicone (MAALOX/MYLANTA) suspension 30 mL, 30 mL, Oral, Q6H PRN, Nj Lloyd MD  •  apixaban (ELIQUIS) tablet 2.5 mg, 2.5 mg, Oral, Q12H, Nj Lloyd MD, 2.5 mg at 02/20/17 2051  •  bisacodyl (DULCOLAX) suppository 10 mg, 10 mg, Rectal, Daily, Nj Lloyd MD, 10 mg at 02/16/17 1446  •  dextrose 5 % and sodium chloride 0.45 % infusion, 75 mL/hr, Intravenous, Continuous, Nj Lloyd MD, Last Rate: 75 mL/hr at 02/20/17 1357, 75 mL/hr at 02/20/17 1357  •  dorzolamide-timolol (COSOPT) ophthalmic solution 1 drop, 1 drop, Both Eyes, BID, Nj Lloyd MD, 1 drop at 02/20/17 1828  •  ipratropium-albuterol (DUO-NEB) nebulizer solution 3 mL, 3 mL, Nebulization, 4x Daily - RT, Nj Lloyd MD, 3 mL at 02/20/17 1941  •  magnesium hydroxide (MILK OF MAGNESIA) suspension 2400 mg/10mL 10 mL, 10 mL, Oral, Daily PRN, Nj Lloyd MD  •  magnesium sulfate in D5W 1g/100mL  "(PREMIX) IVPB 1 g, 1 g, Intravenous, PRN **OR** magnesium sulfate 8 g in dextrose (D5W) 5 % 250 mL infusion, 8 g, Intravenous, PRN **OR** magnesium sulfate 6 g in dextrose (D5W) 5 % 250 mL infusion, 6 g, Intravenous, PRN **OR** magnesium sulfate in D5W 1g/100mL (PREMIX) IVPB 1 g, 1 g, Intravenous, PRN, Casie M Mayne, PA  •  nystatin (MYCOSTATIN) powder, , Topical, Q12H, Nj Lloyd MD  •  ondansetron (ZOFRAN) injection 4 mg, 4 mg, Intravenous, Q6H PRN, Nj Lloyd MD  •  saccharomyces boulardii (FLORASTOR) capsule 250 mg, 250 mg, Oral, BID, Edgardo Beatty MD, 250 mg at 02/20/17 1828  •  sodium chloride 0.9 % flush 1-10 mL, 1-10 mL, Intravenous, PRN, Nj Lloyd MD  •  traMADol (ULTRAM) tablet 50 mg, 50 mg, Oral, Q8H PRN, Nj Lloyd MD, 50 mg at 02/16/17 1057  •  vancomycin oral solution 125 mg, 125 mg, Oral, Q6H, Edgardo Beatty MD, 125 mg at 02/21/17 0553  Antibiotics:  IV Anti-Infectives     Ordered     Dose/Rate Route Frequency Start Stop    02/17/17 0725  vancomycin oral solution 125 mg     Ordering Provider:  Edgardo Beatty MD    125 mg Oral Every 6 Hours Scheduled 02/17/17 0800      02/16/17 0744  vancomycin (VANCOCIN) IVPB 1 g (premix) in Dextrose 5% 200 mL     Ordering Provider:  Travis Nielsen MD    20 mg/kg × 49.9 kg  over 60 Minutes Intravenous Once 02/16/17 0830 02/16/17 1002    02/16/17 0744  piperacillin-tazobactam (ZOSYN) 3.375 g in dextrose 50 mL IVPB (premix)     Ordering Provider:  Travis Nielsen MD    3.375 g  over 0.5 Hours Intravenous Once 02/16/17 0800 02/16/17 0900          Allergies:  has No Known Allergies.    Review of Systems: All other reviewed and negative except as per HPI    Blood pressure 176/99, pulse 92, temperature 98 °F (36.7 °C), temperature source Oral, resp. rate 16, height 62\" (157.5 cm), weight 146 lb 3.2 oz (66.3 kg), SpO2 90 %.  GENERAL: Arouses.  Agitated.  Disoriented to place, day of week, date and president.  HEENT: Oropharynx without thrush. " Sinuses nontender. Dentition in poor repair. No cervical adenopathy. No carotid bruits/ jugular venous distention.   EYES: PERRL. No conjunctival injection. No icterus. EOMI.  LYMPHATICS: No lymphadenopathy of the neck or axillary or inguinal regions.   HEART: No murmur, gallop, or pericardial friction rub.   LUNGS: Clear to auscultation anteriorly. No percussion dullness.   ABDOMEN: Soft, nontender, nondistended. No appreciable HSM.  No peritoneal findings of rebound or guarding.   SKIN: Warm and dry without cutaneous eruptions. No embolic stigmata.  Cellulitis on bilateral anterior tibial surfaces has completely resolved.  PSYCHIATRIC: Mental status lucid. Cranial nerve function intact.       DIAGNOSTICS:  Lab Results   Component Value Date    WBC 4.73 02/20/2017    HGB 9.9 (L) 02/20/2017    HCT 30.1 (L) 02/20/2017     02/20/2017     No results found for: CRP  No results found for: SEDRATE  Lab Results   Component Value Date    GLUCOSE 113 (H) 02/20/2017    BUN 11 02/20/2017    CREATININE 0.70 02/20/2017    EGFRIFNONA 78 02/20/2017    BCR 15.7 02/20/2017    CO2 24.0 02/20/2017    CALCIUM 8.8 02/20/2017    ALBUMIN 3.60 02/16/2017    LABIL2 1.1 (L) 02/16/2017    AST 17 02/16/2017    ALT 10 02/16/2017       Microbiology: C. difficile toxin assay positive by PCR.  10 to the fifth colony-forming units of Proteus species in urine.  One of 2 admission blood cultures with coagulase-negative staphylococci/presumed cutaneous contaminant.      RADIOLOGY:  Imaging Results (last 72 hours)     ** No results found for the last 72 hours. **          Assessment and Plan: Severe sepsis.  Hypotension.  Bilateral anterior tibial cellulitis.  Proteus urinary tract infection.  Clostridium difficile diarrhea.  Lactic acidosis.  Advanced dementia/nursing home resident.  Maximum temperature over 24 hours 98.6°.  Currently 98.0.  Plasma creatinine is 0.7.  Peripheral leukocyte count down to 4700.  Cultures reviewed.  Utilization  management: We will discontinue doxycycline and piperacillin-tazobactam as the cellulitis has entirely resolved in bilateral lower extremities and the patient has received an adequate course of therapy for a lower urinary tract infection.  We will continue vancomycin suspension and probiotics.  Anticipate return to nursing home today.      Edgardo Beatty MD  2/21/2017

## 2017-02-21 NOTE — PLAN OF CARE
Problem: Patient Care Overview (Adult)  Goal: Plan of Care Review  Outcome: Ongoing (interventions implemented as appropriate)    02/21/17 1045   Coping/Psychosocial Response Interventions   Plan Of Care Reviewed With patient   Patient Care Overview   Progress no change   Outcome Evaluation   Outcome Summary/Follow up Plan Pt denied pain when asked today, but nurse reported pt cries out at times when moved. Palliative information for follow up at NH placed in chart and RN notified, agreed to give to family when they come today.

## 2017-02-22 PROCEDURE — 99232 SBSQ HOSP IP/OBS MODERATE 35: CPT | Performed by: INTERNAL MEDICINE

## 2017-02-22 PROCEDURE — 94799 UNLISTED PULMONARY SVC/PX: CPT

## 2017-02-22 PROCEDURE — 94760 N-INVAS EAR/PLS OXIMETRY 1: CPT

## 2017-02-22 PROCEDURE — 94640 AIRWAY INHALATION TREATMENT: CPT

## 2017-02-22 RX ORDER — AMLODIPINE BESYLATE 5 MG/1
5 TABLET ORAL
Status: DISCONTINUED | OUTPATIENT
Start: 2017-02-22 | End: 2017-02-23 | Stop reason: HOSPADM

## 2017-02-22 RX ORDER — IPRATROPIUM BROMIDE AND ALBUTEROL SULFATE 2.5; .5 MG/3ML; MG/3ML
3 SOLUTION RESPIRATORY (INHALATION) EVERY 4 HOURS PRN
Status: DISCONTINUED | OUTPATIENT
Start: 2017-02-22 | End: 2017-02-23 | Stop reason: HOSPADM

## 2017-02-22 RX ADMIN — APIXABAN 2.5 MG: 2.5 TABLET, FILM COATED ORAL at 20:26

## 2017-02-22 RX ADMIN — Medication 125 MG: at 12:26

## 2017-02-22 RX ADMIN — APIXABAN 2.5 MG: 2.5 TABLET, FILM COATED ORAL at 09:12

## 2017-02-22 RX ADMIN — Medication 125 MG: at 01:14

## 2017-02-22 RX ADMIN — AMLODIPINE BESYLATE 5 MG: 5 TABLET ORAL at 09:12

## 2017-02-22 RX ADMIN — Medication 125 MG: at 06:27

## 2017-02-22 RX ADMIN — Medication 250 MG: at 09:12

## 2017-02-22 RX ADMIN — DORZOLAMIDE HYDROCHLORIDE AND TIMOLOL MALEATE 1 DROP: 20; 5 SOLUTION/ DROPS OPHTHALMIC at 09:12

## 2017-02-22 RX ADMIN — IPRATROPIUM BROMIDE AND ALBUTEROL SULFATE 3 ML: .5; 3 SOLUTION RESPIRATORY (INHALATION) at 09:07

## 2017-02-22 RX ADMIN — ACETAMINOPHEN 500 MG: 500 TABLET ORAL at 09:12

## 2017-02-22 RX ADMIN — NYSTATIN: 100000 POWDER TOPICAL at 21:00

## 2017-02-22 RX ADMIN — Medication 125 MG: at 17:47

## 2017-02-22 RX ADMIN — IPRATROPIUM BROMIDE AND ALBUTEROL SULFATE 3 ML: .5; 3 SOLUTION RESPIRATORY (INHALATION) at 13:04

## 2017-02-22 RX ADMIN — NYSTATIN: 100000 POWDER TOPICAL at 09:12

## 2017-02-22 RX ADMIN — DORZOLAMIDE HYDROCHLORIDE AND TIMOLOL MALEATE 1 DROP: 20; 5 SOLUTION/ DROPS OPHTHALMIC at 17:47

## 2017-02-22 NOTE — PROGRESS NOTES
"      HOSPITALIST DAILY PROGRESS NOTE    Chief Complaint: weakness    Subjective   SUBJECTIVE/OVERNIGHT EVENTS   Lying in bed comfortably. Has no complaints. Diarrhea improving. No concerns from nursing.    Review of Systems:  Gen-no fevers, no chills  CV-no chest pain, no palpitations  Resp-no cough, no dyspnea  GI-no N/V/D, no abd pain    Objective   OBJECTIVE   I have reviewed the vital signs.  Visit Vitals   • /99 (BP Location: Left arm, Patient Position: Lying)   • Pulse 62   • Temp 96.4 °F (35.8 °C) (Oral)   • Resp 16   • Ht 62\" (157.5 cm)   • Wt 146 lb 2.6 oz (66.3 kg)   • SpO2 99%   • BMI 26.73 kg/m2       Physical Exam:  Gen-no acute distress, chronically ill appearing  CV-RRR, S1 S2 normal, no m/r/g  Resp-CTAB, no wheezes  Abd-soft, NT, ND, +BS  Ext-no edema  Neuro-Alert and interactive. Moves extremities.  Psych-flat affect    Results:  I have reviewed the labs, culture data, radiology results, and diagnostic studies.      Results from last 7 days  Lab Units 02/20/17  0844 02/19/17  0607 02/18/17  0609   WBC 10*3/mm3 4.73 4.88 5.16   HEMOGLOBIN g/dL 9.9* 9.5* 8.9*   HEMATOCRIT % 30.1* 28.0* 27.4*   PLATELETS 10*3/mm3 385 350 324       Results from last 7 days  Lab Units 02/20/17  0844   SODIUM mmol/L 141   POTASSIUM mmol/L 3.6   CHLORIDE mmol/L 111*   TOTAL CO2 mmol/L 24.0   BUN mg/dL 11   CREATININE mg/dL 0.70   GLUCOSE mg/dL 113*   CALCIUM mg/dL 8.8       Culture Data:  Cultures:    BLOOD CULTURE   Date Value Ref Range Status   02/16/2017 Staphylococcus, coagulase negative (A)  Final     Comment:     Susceptibility will not be done unless Doctor notifies Lab  Suggests contamination     02/16/2017 No growth at 5 days  Final     URINE CULTURE   Date Value Ref Range Status   02/16/2017 >100,000 CFU/mL Proteus mirabilis (A)  Final       I have reviewed the medications.      Assessment/Plan   ASSESSMENT/PLAN    Principal Problem:    Severe sepsis  Active Problems:    Chronic pain    UTI (urinary tract " infection)    HCAP (healthcare-associated pneumonia)    PVD (peripheral vascular disease)    Hearing impaired    Lactic acid acidosis    Cellulitis    Plan:  --Increase amlodipine to 5mg.  --Patient improving clinically on current antibiotic regimen. ID following. Has completed antibiotics for UTI.  --Continue vancomycin taper at 125 mg by mouth every 6 hours for one week with reduction in weekly dosing over the next month in addition to probiotic therapy with Florastor 250 mg by mouth twice a day.  --Hopefully back to NH once bed available. CM following.    Zully Ferrara II, DO  02/22/17  12:10 PM

## 2017-02-22 NOTE — PROGRESS NOTES
Continued Stay Note  [facility]     Patient Name: Joana Dixon  MRN: [id number]  Today's Date: 2/22/2017    Admit Date: 2/16/2017          Discharge Plan       02/22/17 1451    Case Management/Social Work Plan    Plan [facility]    Patient/Family In Agreement With Plan yes    Additional Comments SW heard back from Ivette at [facility]. Pt has a private room at [facility] tomorrow. Please provide transfer summary and fax to [phone]. Please provide hard scripts for any controlled substances. Number to call report is [phone].     Final Note    Final Note SW is following              Discharge Codes     None        Expected Discharge Date and Time     Expected Discharge Date Expected Discharge Time    Feb 22, 2017             CHEL Lewis

## 2017-02-22 NOTE — PLAN OF CARE
Problem: Patient Care Overview (Adult)  Goal: Plan of Care Review  Outcome: Ongoing (interventions implemented as appropriate)    02/22/17 1053   Coping/Psychosocial Response Interventions   Plan Of Care Reviewed With patient   Patient Care Overview   Progress no change   Outcome Evaluation   Outcome Summary/Follow up Plan WO follow-up for right heel unstageable pressure injury. Wound bed still presents with 100% moist boggy slough. Will continue with Thera honey for autolytic debridement. Will continue to follow. Will consult PT wound care for mechanical debridement. Patient on ISATU bed/mattress. Please contact WO if needs arise. Thanks

## 2017-02-22 NOTE — SIGNIFICANT NOTE
Palliative Team Meeting Attendance  13:00               DO WILLIAM Gaytan, RN, CHKELLEE Vieyra, [facility], [facility]-              WILLIAM Velasco M.Div., [facility], [facility]                            CAMILLA Chaudhry RN, KELLEE Gutierrez, HIRAM Middleton RN, PN

## 2017-02-22 NOTE — PLAN OF CARE
Problem: Patient Care Overview (Adult)  Goal: Plan of Care Review  Outcome: Ongoing (interventions implemented as appropriate)  Pt b/p is still elevated, Dr Marquez notified.     02/22/17 0700   Coping/Psychosocial Response Interventions   Plan Of Care Reviewed With daughter   Patient Care Overview   Progress no change

## 2017-02-22 NOTE — PROGRESS NOTES
Continued Stay Note  [facility]     Patient Name: Joana Dixon  MRN: [id number]  Today's Date: 2/22/2017    Admit Date: 2/16/2017          Discharge Plan       02/22/17 1608    Case Management/Social Work Plan    Additional Comments GARRETT spoke with pt's daughter about transportation and pt's daughter reports pt will need an ambulance. GARRETT spoke with pt's nurse and pt has been bed confined and also has a wound on right heel. GARRETT called [facility] [phone] and set up an ambulance for pt to transfer to [facility] tomorrow at 4:00pm. GARRETT updated pt's dauhgter.      02/22/17 1451    Case Management/Social Work Plan    Plan [facility]    Patient/Family In Agreement With Plan yes    Additional Comments GARRETT heard back from Ivette at [facility]. Pt has a private room at [facility] tomorrow. Please provide transfer summary and fax to [phone]. Please provide hard scripts for any controlled substances. Number to call report is [phone].     Final Note    Final Note GARRETT is following              Discharge Codes     None        Expected Discharge Date and Time     Expected Discharge Date Expected Discharge Time    Feb 22, 2017             CHEL Lewis

## 2017-02-22 NOTE — PROGRESS NOTES
Joana Dixon  9/24/1923  [id number]  2/22/2017    CC: Hypotension and severe sepsis.    Joana Dixon is a 89 y.o. female here for lateral lower extremity cellulitis, Proteus urinary tract infection, left lower lobe pneumonia, and Clostridium difficile diarrhea..         Past medical history:  Past Medical History   Diagnosis Date   • Anemia    • Arthritis    • Chronic kidney disease (CKD), stage III (moderate)    • Chronic pain      right hip   • Confusion    • Dehydration    • Dementia    • Glaucoma    • H/O urinary frequency    • Hypertension    • Insomnia    • Macular degeneration    • PVD (peripheral vascular disease)      wears compression stockings   • Renal disorder    • Upper respiratory infection        Medications:   Current Facility-Administered Medications:   •  acetaminophen (TYLENOL) suppository 650 mg, 650 mg, Rectal, Q4H PRN, Nj Lloyd MD  •  acetaminophen (TYLENOL) tablet 500 mg, 500 mg, Oral, Daily, Nj Lloyd MD, 500 mg at 02/21/17 0908  •  aluminum-magnesium hydroxide-simethicone (MAALOX/MYLANTA) suspension 30 mL, 30 mL, Oral, Q6H PRN, Nj Lloyd MD  •  amLODIPine (NORVASC) tablet 5 mg, 5 mg, Oral, Q24H, Zully Ferrara II,   •  apixaban (ELIQUIS) tablet 2.5 mg, 2.5 mg, Oral, Q12H, Nj Lloyd MD, 2.5 mg at 02/21/17 2113  •  bisacodyl (DULCOLAX) suppository 10 mg, 10 mg, Rectal, Daily, Nj Lloyd MD, 10 mg at 02/16/17 1446  •  dorzolamide-timolol (COSOPT) ophthalmic solution 1 drop, 1 drop, Both Eyes, BID, Nj Lloyd MD, 1 drop at 02/21/17 1759  •  ipratropium-albuterol (DUO-NEB) nebulizer solution 3 mL, 3 mL, Nebulization, 4x Daily - RT, Nj Lloyd MD, 3 mL at 02/21/17 2126  •  magnesium hydroxide (MILK OF MAGNESIA) suspension 2400 mg/10mL 10 mL, 10 mL, Oral, Daily PRN, Nj Lloyd MD  •  magnesium sulfate in D5W 1g/100mL (PREMIX) IVPB 1 g, 1 g, Intravenous, PRN **OR** magnesium sulfate 8 g in dextrose (D5W) 5 % 250 mL infusion, 8 g, Intravenous, PRN **OR**  "magnesium sulfate 6 g in dextrose (D5W) 5 % 250 mL infusion, 6 g, Intravenous, PRN **OR** magnesium sulfate in D5W 1g/100mL (PREMIX) IVPB 1 g, 1 g, Intravenous, PRN, Casie M Mayne, PA  •  nystatin (MYCOSTATIN) powder, , Topical, Q12H, Nj Lloyd MD  •  ondansetron (ZOFRAN) injection 4 mg, 4 mg, Intravenous, Q6H PRN, Nj Lloyd MD  •  saccharomyces boulardii (FLORASTOR) capsule 250 mg, 250 mg, Oral, BID, Edgardo Beatty MD, 250 mg at 02/21/17 2113  •  sodium chloride 0.9 % flush 1-10 mL, 1-10 mL, Intravenous, PRN, Nj Lloyd MD  •  traMADol (ULTRAM) tablet 50 mg, 50 mg, Oral, Q8H PRN, Nj Lloyd MD, 50 mg at 02/16/17 1057  •  vancomycin oral solution 125 mg, 125 mg, Oral, Q6H, Edgardo Beatty MD, 125 mg at 02/22/17 0627  Antibiotics:  IV Anti-Infectives     Ordered     Dose/Rate Route Frequency Start Stop    02/17/17 0725  vancomycin oral solution 125 mg     Ordering Provider:  Edgardo Beatty MD    125 mg Oral Every 6 Hours Scheduled 02/17/17 0800      02/16/17 0744  vancomycin (VANCOCIN) IVPB 1 g (premix) in Dextrose 5% 200 mL     Ordering Provider:  Travis Nielsen MD    20 mg/kg × 49.9 kg  over 60 Minutes Intravenous Once 02/16/17 0830 02/16/17 1002    02/16/17 0744  piperacillin-tazobactam (ZOSYN) 3.375 g in dextrose 50 mL IVPB (premix)     Ordering Provider:  Travis Nielsen MD    3.375 g  over 0.5 Hours Intravenous Once 02/16/17 0800 02/16/17 0900          Allergies:  has No Known Allergies.    Review of Systems: All other reviewed and negative except as per HPI    Blood pressure (!) 158/110, pulse 81, temperature 95.5 °F (35.3 °C), temperature source Oral, resp. rate 18, height 62\" (157.5 cm), weight 146 lb 2.6 oz (66.3 kg), SpO2 98 %.  GENERAL: Awake and alert.  Quite hard of hearing.  Intermittently agitated.   HEENT: Oropharynx without thrush.  Dry oropharyngeal mucosa.  Sinuses nontender. Dentition in poor repair. No cervical adenopathy. No carotid bruits/ jugular venous " distention.   EYES: PERRL. No conjunctival injection. No icterus. EOMI.  LYMPHATICS: No lymphadenopathy of the neck or axillary or inguinal regions.   HEART: No murmur, gallop, or pericardial friction rub.   LUNGS: Clear to auscultation anteriorly. No percussion dullness.   ABDOMEN: Soft, nontender, nondistended. No appreciable HSM.  No CVA tenderness to punch.  No suprapubic discomfort with deep palpation.  SKIN: Warm and dry without cutaneous eruptions. No embolic stigmata.  Bilateral lower extremity cellulitis has resolved.  PSYCHIATRIC: Agitated.  Disoriented.  Underlying Alzheimer's type dementia.      DIAGNOSTICS:  Lab Results   Component Value Date    WBC 4.73 02/20/2017    HGB 9.9 (L) 02/20/2017    HCT 30.1 (L) 02/20/2017     02/20/2017     No results found for: CRP  No results found for: SEDRATE  Lab Results   Component Value Date    GLUCOSE 113 (H) 02/20/2017    BUN 11 02/20/2017    CREATININE 0.70 02/20/2017    EGFRIFNONA 78 02/20/2017    BCR 15.7 02/20/2017    CO2 24.0 02/20/2017    CALCIUM 8.8 02/20/2017    ALBUMIN 3.60 02/16/2017    LABIL2 1.1 (L) 02/16/2017    AST 17 02/16/2017    ALT 10 02/16/2017       Microbiology: 10 to the fifth colony-forming units of Proteus in urine.  C. difficile toxin assay positive by PCR.      RADIOLOGY:  Imaging Results (last 72 hours)     ** No results found for the last 72 hours. **          Assessment and Plan: Severe sepsis.  Hypotension responsive to volume resuscitation.  Lactic acidosis.  Acute kidney injury.  Leukocytosis.  Clostridium difficile diarrhea.  Proteus urinary tract infection.  Bilateral lower extremity cellulitis.  Non-resolving left lower lobe pulmonary infiltrate.  Advanced dementia.  Maximum temperature over 24 hours 97.0.  Currently 95.5.  Plasma creatinine down 0.7.  Peripheral leukocyte count 4730.  Utilization management: The cellulitis in bilateral lower extremities has resolved.  The patient has had an adequate course of therapy for  her Proteus urinary tract infection.  Her diarrhea is much improved on vancomycin suspension and probiotic therapy.  Acceptable for return to the nursing home with any point in time.  Recommend vancomycin taper with probiotic therapy over the next 4 weeks.      Edgardo Beatty MD  2/22/2017

## 2017-02-23 VITALS
HEART RATE: 85 BPM | HEIGHT: 62 IN | RESPIRATION RATE: 18 BRPM | OXYGEN SATURATION: 96 % | BODY MASS INDEX: 26.9 KG/M2 | WEIGHT: 146.16 LBS | TEMPERATURE: 97.6 F | SYSTOLIC BLOOD PRESSURE: 121 MMHG | DIASTOLIC BLOOD PRESSURE: 75 MMHG

## 2017-02-23 PROCEDURE — 99239 HOSP IP/OBS DSCHRG MGMT >30: CPT | Performed by: INTERNAL MEDICINE

## 2017-02-23 RX ORDER — AMLODIPINE BESYLATE 5 MG/1
5 TABLET ORAL
Start: 2017-02-23

## 2017-02-23 RX ORDER — SACCHAROMYCES BOULARDII 250 MG
250 CAPSULE ORAL 2 TIMES DAILY
Qty: 60 CAPSULE | Refills: 0 | Status: SHIPPED | OUTPATIENT
Start: 2017-02-23

## 2017-02-23 RX ORDER — TRAMADOL HYDROCHLORIDE 50 MG/1
50 TABLET ORAL EVERY 8 HOURS PRN
Refills: 0
Start: 2017-02-23 | End: 2017-02-26

## 2017-02-23 RX ADMIN — AMLODIPINE BESYLATE 5 MG: 5 TABLET ORAL at 09:07

## 2017-02-23 RX ADMIN — DORZOLAMIDE HYDROCHLORIDE AND TIMOLOL MALEATE 1 DROP: 20; 5 SOLUTION/ DROPS OPHTHALMIC at 09:07

## 2017-02-23 RX ADMIN — Medication 125 MG: at 06:49

## 2017-02-23 RX ADMIN — Medication 125 MG: at 00:44

## 2017-02-23 RX ADMIN — Medication 125 MG: at 12:01

## 2017-02-23 RX ADMIN — NYSTATIN: 100000 POWDER TOPICAL at 09:08

## 2017-02-23 RX ADMIN — APIXABAN 2.5 MG: 2.5 TABLET, FILM COATED ORAL at 09:07

## 2017-02-23 NOTE — SIGNIFICANT NOTE
Palliative Team Meeting Attendance  13:00                DO WILLIAM Gaytan, RN, CHPN              LISANDRO Herring RN, CHPN

## 2017-02-23 NOTE — PROGRESS NOTES
Continued Stay Note  [facility]     Patient Name: Joana Dixon  MRN: [id number]  Today's Date: 2/23/2017    Admit Date: 2/16/2017          Discharge Plan       02/23/17 1239    Case Management/Social Work Plan    Additional Comments SW faxed transfer summary to [phone]. Number to call report is [phone].              Discharge Codes     None        Expected Discharge Date and Time     Expected Discharge Date Expected Discharge Time    Feb 23, 2017             CHEL Lewis

## 2017-02-23 NOTE — PLAN OF CARE
Problem: Patient Care Overview (Adult)  Goal: Plan of Care Review  Outcome: Ongoing (interventions implemented as appropriate)  Vitals stable this shift and pt in NSR.     02/23/17 0521   Coping/Psychosocial Response Interventions   Plan Of Care Reviewed With daughter

## 2017-02-23 NOTE — DISCHARGE SUMMARY
HOSPITAL MEDICINE DISCHARGE SUMMARY    Date of Admission: 2/16/2017  Date of Discharge:  2/23/2017    Discharge Diagnoses:  Principal Problem:    Severe sepsis  Active Problems:    Chronic pain    UTI (urinary tract infection)    HCAP (healthcare-associated pneumonia)    PVD (peripheral vascular disease)    Hearing impaired    Lactic acid acidosis    Cellulitis      Presenting Problem/History of Present Illness  Severe sepsis [A41.9, R65.20]  Severe sepsis [A41.9, R65.20]  Patient is a 89 y.o. female presented with severe sepsis due to UTI and cellulitis.      Discharge Day HPI: Patient lying in bed. No complaints. Pain relatively well controlled at this time. Diarrhea improved.      Hospital Course:     Severe sepsis due to UTI and cellulitis: 89-year-old female admitted from nursing facility due to severe sepsis thought to be due to UTI and lower extremity cellulitis.  On arrival patient had a metabolic encephalopathy, lactic acidosis, and an acute kidney injury.  The patient was aggressively resuscitated with IV fluids and placed on broad-spectrum antibiotics.  Cultures were obtained.  Blood cultures remained negative throughout her stay.  Her urine culture grew Proteus with multiple sensitivities.  Infectious disease was consulted to transition the patient to Zosyn and doxycycline which she completed a 7 day course of.  Her cellulitis greatly improved with antibiotics and wound care.    Clostridium difficile colitis:  Unfortunate patient's stay was complicated by Clostridium difficile colitis which she developed shortly after initiation of antibiotics.  Patient was placed on oral vancomycin and probiotics.  Per infectious disease Shabbir the patient will continue vancomycin 125 every 6 hours for one week and then will taper the vancomycin over the next month.  Instructions are in the medication reconciliation.  The patient will follow-up with infectious disease in 2-4 weeks as a hospital  follow-up.    Procedures Performed     None    Consults:   Consults     Date and Time Order Name Status Description    2/16/2017 1138 Inpatient Consult to Infectious Diseases Completed     2/16/2017 1015 Inpatient Consult to Palliative Care MD Completed           Pertinent Test Results:   Lab Results (last 7 days)     Procedure Component Value Units Date/Time    Urinalysis With / Culture If Indicated [[id number]]  (Abnormal) Collected:  02/16/17 0758    Specimen:  Urine from Urine, Catheter Updated:  02/16/17 0817     Color, UA Dark Yellow (A)      Appearance, UA Turbid (A)      pH, UA 8.5 (H)      Specific Gravity, UA 1.020      Glucose, UA Negative      Ketones, UA Negative      Bilirubin, UA Negative      Blood, UA Small (1+) (A)      Protein, UA 30 mg/dL (1+) (A)      Leuk Esterase, UA Large (3+) (A)      Nitrite, UA Positive (A)      Urobilinogen, UA 0.2 E.U./dL     Urinalysis, Microscopic Only [[id number]]  (Abnormal) Collected:  02/16/17 0758    Specimen:  Urine from Urine, Catheter Updated:  02/16/17 0817     RBC, UA 0-2 /HPF      WBC, UA Too Numerous to Count (A) /HPF      Bacteria, UA 4+ (A) /HPF      Squamous Epithelial Cells, UA 0-2 /HPF      Hyaline Casts, UA 0-6 /LPF      Methodology Automated Microscopy     Comprehensive Metabolic Panel [[id number]]  (Abnormal) Collected:  02/16/17 0812    Specimen:  Blood Updated:  02/16/17 0859     Glucose 101 (H) mg/dL      BUN 33 (H) mg/dL      Creatinine 1.00 mg/dL      Sodium 138 mmol/L      Potassium 4.1 mmol/L      Chloride 105 mmol/L      CO2 25.0 mmol/L      Calcium 9.3 mg/dL      Total Protein 7.0 g/dL      Albumin 3.60 g/dL      ALT (SGPT) 10 U/L      AST (SGOT) 17 U/L      Alkaline Phosphatase 76 U/L      Total Bilirubin 0.4 mg/dL      eGFR Non African Amer 52 (L) mL/min/1.73      Globulin 3.4 gm/dL      A/G Ratio 1.1 (L) g/dL      BUN/Creatinine Ratio 33.0 (H)      Anion Gap 8.0 mmol/L     Narrative:       National Kidney [employer] Guidelines    Stage                            Description                             GFR                      1                               Normal or High                          90+  2                               Mild decrease                            60-89  3                               Moderate decrease                   30-59  4                               Severe decrease                       15-29  5                               Kidney failure                             <15    Lactic Acid, Plasma [[id number]]  (Abnormal) Collected:  02/16/17 0812    Specimen:  Blood Updated:  02/16/17 0907     Lactate 4.2 (C) mmol/L       Falsely depressed results may occur on samples drawn from patients receiving N-Acetylcysteine (NAC) or Metamizole.       CBC Auto Differential [[id number]]  (Abnormal) Collected:  02/16/17 0812    Specimen:  Blood Updated:  02/16/17 0923     WBC 13.66 (H) 10*3/mm3      RBC 3.27 (L) 10*6/mm3      Hemoglobin 9.7 (L) g/dL      Hematocrit 30.7 (L) %      MCV 93.9 fL      MCH 29.7 pg      MCHC 31.6 (L) g/dL      RDW 15.1 (H) %      RDW-SD 51.9 fl      MPV 10.6 fL      Platelets 387 10*3/mm3      Neutrophil % 91.8 (H) %      Lymphocyte % 1.2 (L) %      Monocyte % 4.7 %      Eosinophil % 1.5 %      Basophil % 0.1 %      Immature Grans % 0.7 (H) %      Neutrophils, Absolute 12.55 (H) 10*3/mm3      Lymphocytes, Absolute 0.17 (L) 10*3/mm3      Monocytes, Absolute 0.64 10*3/mm3      Eosinophils, Absolute 0.20 10*3/mm3      Basophils, Absolute 0.01 10*3/mm3      Immature Grans, Absolute 0.09 (H) 10*3/mm3     Narrative:       Appended report.  These results have been appended to a previously verified report.    CBC & Differential [[id number]] Collected:  02/16/17 0812    Specimen:  Blood Updated:  02/16/17 0924    Narrative:       The following orders were created for panel order CBC & Differential.  Procedure                               Abnormality         Status                     ---------                                -----------         ------                     Scan Slide[[id number]]                                        Final result               CBC Auto Differential[[id number]]         Abnormal            Final result                 Please view results for these tests on the individual orders.    Scan Slide [[id number]] Collected:  02/16/17 0812    Specimen:  Blood Updated:  02/16/17 0924     Stomatocytes Slight/1+      WBC Morphology Normal      Platelet Morphology Normal     Lactate Acid, Reflex [[id number]]  (Abnormal) Collected:  02/16/17 1138    Specimen:  Blood Updated:  02/16/17 1229     Lactate 4.6 (C) mmol/L       Falsely depressed results may occur on samples drawn from patients receiving N-Acetylcysteine (NAC) or Metamizole.       Clostridium Difficile Toxin, PCR [[id number]]  (Abnormal) Collected:  02/16/17 1345    Specimen:  Stool from Per Rectum Updated:  02/16/17 1515     C. Difficile Toxins by PCR Detected (C)       For in vitro diagnostic use only. 027-[id number] results are not intended to guide treatment of C. Difficile infections.       Narrative:         Performance characteristics of test not established for patients <2 years of age.    Lactic Acid, Plasma [[id number]]  (Normal) Collected:  02/16/17 1600    Specimen:  Blood Updated:  02/16/17 1633     Lactate 1.4 mmol/L       Falsely depressed results may occur on samples drawn from patients receiving N-Acetylcysteine (NAC) or Metamizole.       Lactic Acid, Plasma [[id number]]  (Abnormal) Collected:  02/16/17 1930    Specimen:  Blood Updated:  02/16/17 2103     Lactate 3.2 (C) mmol/L       Falsely depressed results may occur on samples drawn from patients receiving N-Acetylcysteine (NAC) or Metamizole.       Narrative:       Verified by repeat analysis.    Lactic Acid, Plasma [[id number]]  (Normal) Collected:  02/17/17 0022    Specimen:  Blood Updated:  02/17/17 0122     Lactate 1.1 mmol/L       Falsely depressed results may occur on samples drawn from patients  receiving N-Acetylcysteine (NAC) or Metamizole.       Basic Metabolic Panel [[id number]]  (Abnormal) Collected:  02/17/17 0547    Specimen:  Blood Updated:  02/17/17 0708     Glucose 58 (L) mg/dL      BUN 26 (H) mg/dL      Creatinine 0.80 mg/dL      Sodium 137 mmol/L      Potassium 4.1 mmol/L      Chloride 110 (H) mmol/L      CO2 23.0 mmol/L      Calcium 8.0 (L) mg/dL      eGFR Non African Amer 67 mL/min/1.73      BUN/Creatinine Ratio 32.5 (H)      Anion Gap 4.0 mmol/L     Narrative:       National Kidney Foundation Guidelines    Stage                           Description                             GFR                      1                               Normal or High                          90+  2                               Mild decrease                            60-89  3                               Moderate decrease                   30-59  4                               Severe decrease                       15-29  5                               Kidney failure                             <15    Magnesium [[id number]]  (Normal) Collected:  02/17/17 0547    Specimen:  Blood Updated:  02/17/17 0708     Magnesium 1.8 mg/dL     Phosphorus [[id number]]  (Normal) Collected:  02/17/17 0547    Specimen:  Blood Updated:  02/17/17 0708     Phosphorus 3.2 mg/dL     CBC & Differential [[id number]] Collected:  02/17/17 0547    Specimen:  Blood Updated:  02/17/17 0758    Narrative:       The following orders were created for panel order CBC & Differential.  Procedure                               Abnormality         Status                     ---------                               -----------         ------                     Scan Slide[[id number]]                    Normal              Final result               CBC Auto Differential[[id number]]         Abnormal            Final result                 Please view results for these tests on the individual orders.    CBC Auto Differential [[id number]]  (Abnormal) Collected:   02/17/17 0547    Specimen:  Blood Updated:  02/17/17 0758     WBC 6.93 10*3/mm3      RBC 2.94 (L) 10*6/mm3      Hemoglobin 8.5 (L) g/dL      Hematocrit 27.7 (L) %      MCV 94.2 fL      MCH 28.9 pg      MCHC 30.7 (L) g/dL      RDW 15.4 (H) %      RDW-SD 53.7 fl      MPV 10.1 fL      Platelets 323 10*3/mm3      Neutrophil % 71.9 (H) %      Lymphocyte % 8.8 (L) %      Monocyte % 8.4 %      Eosinophil % 10.2 (H) %      Basophil % 0.1 %      Immature Grans % 0.6 %      Neutrophils, Absolute 4.98 10*3/mm3      Lymphocytes, Absolute 0.61 10*3/mm3      Monocytes, Absolute 0.58 10*3/mm3      Eosinophils, Absolute 0.71 (H) 10*3/mm3      Basophils, Absolute 0.01 10*3/mm3      Immature Grans, Absolute 0.04 (H) 10*3/mm3     Narrative:       Appended report.  These results have been appended to a previously verified report.    Scan Slide [[id number]]  (Normal) Collected:  02/17/17 0547    Specimen:  Blood Updated:  02/17/17 0758     RBC Morphology Normal      WBC Morphology Normal      Platelet Morphology Normal     Blood Culture ID, PCR [[id number]]  (Abnormal) Collected:  02/16/17 0825    Specimen:  Blood from Arm, Right Updated:  02/17/17 1124     BCID, PCR        Staphylococcus spp, not aureus. Identification by BCID PCR. (A)    CBC & Differential [[id number]] Collected:  02/18/17 0609    Specimen:  Blood Updated:  02/18/17 0645    Narrative:       The following orders were created for panel order CBC & Differential.  Procedure                               Abnormality         Status                     ---------                               -----------         ------                     CBC Auto Differential[[id number]]         Abnormal            Final result                 Please view results for these tests on the individual orders.    CBC Auto Differential [[id number]]  (Abnormal) Collected:  02/18/17 0609    Specimen:  Blood Updated:  02/18/17 0645     WBC 5.16 10*3/mm3      RBC 2.95 (L) 10*6/mm3      Hemoglobin 8.9 (L) g/dL       Hematocrit 27.4 (L) %      MCV 92.9 fL      MCH 30.2 pg      MCHC 32.5 g/dL      RDW 15.3 (H) %      RDW-SD 52.6 fl      MPV 9.7 fL      Platelets 324 10*3/mm3      Neutrophil % 49.8 %      Lymphocyte % 21.5 (L) %      Monocyte % 10.1 %      Eosinophil % 17.8 (H) %      Basophil % 0.2 %      Immature Grans % 0.6 %      Neutrophils, Absolute 2.57 10*3/mm3      Lymphocytes, Absolute 1.11 10*3/mm3      Monocytes, Absolute 0.52 10*3/mm3      Eosinophils, Absolute 0.92 (H) 10*3/mm3      Basophils, Absolute 0.01 10*3/mm3      Immature Grans, Absolute 0.03 10*3/mm3     Magnesium [[id number]]  (Normal) Collected:  02/18/17 0609    Specimen:  Blood Updated:  02/18/17 0655     Magnesium 1.7 mg/dL     Basic Metabolic Panel [[id number]]  (Abnormal) Collected:  02/18/17 0609    Specimen:  Blood Updated:  02/18/17 0655     Glucose 73 mg/dL      BUN 21 mg/dL      Creatinine 0.70 mg/dL      Sodium 136 mmol/L      Potassium 3.9 mmol/L      Chloride 112 (H) mmol/L      CO2 20.0 mmol/L      Calcium 8.1 (L) mg/dL      eGFR Non African Amer 78 mL/min/1.73      BUN/Creatinine Ratio 30.0 (H)      Anion Gap 4.0 mmol/L     Narrative:       National Kidney Foundation Guidelines    Stage                           Description                             GFR                      1                               Normal or High                          90+  2                               Mild decrease                            60-89  3                               Moderate decrease                   30-59  4                               Severe decrease                       15-29  5                               Kidney failure                             <15    Urine Culture [[id number]]  (Abnormal)  (Susceptibility) Collected:  02/16/17 0758    Specimen:  Urine from Urine, Catheter Updated:  02/18/17 1134     Urine Culture >100,000 CFU/mL Proteus mirabilis (A)     Susceptibility      Proteus mirabilis     CHUCK     Ampicillin <=8 ug/ml  Susceptible     Ampicillin + Sulbactam <=8/4 ug/ml Susceptible     Aztreonam <=8 ug/ml Susceptible     Cefepime <=8 ug/ml Susceptible     Cefotaxime <=2 ug/ml Susceptible     Ceftriaxone <=8 ug/ml Susceptible     Cefuroxime <=4 ug/ml Susceptible     Cephalothin <=8 ug/ml Susceptible     Ertapenem <=1 ug/ml Susceptible     Gentamicin <=4 ug/ml Susceptible     Levofloxacin <=2 ug/ml Susceptible     Meropenem <=1 ug/ml Susceptible     Nitrofurantoin 64 ug/ml Intermediate     Piperacillin + Tazobactam <=16 ug/ml Susceptible     Tetracycline >8 ug/ml Resistant     Tobramycin <=4 ug/ml Susceptible     Trimethoprim + Sulfamethoxazole <=2/38 ug/ml Susceptible                    CBC & Differential [[id number]] Collected:  02/19/17 0607    Specimen:  Blood Updated:  02/19/17 0634    Narrative:       The following orders were created for panel order CBC & Differential.  Procedure                               Abnormality         Status                     ---------                               -----------         ------                     CBC Auto Differential[[id number]]         Abnormal            Final result                 Please view results for these tests on the individual orders.    CBC Auto Differential [[id number]]  (Abnormal) Collected:  02/19/17 0607    Specimen:  Blood Updated:  02/19/17 0634     WBC 4.88 10*3/mm3      RBC 3.14 (L) 10*6/mm3      Hemoglobin 9.5 (L) g/dL      Hematocrit 28.0 (L) %      MCV 89.2 fL      MCH 30.3 pg      MCHC 33.9 g/dL      RDW 14.9 (H) %      RDW-SD 48.3 fl      MPV 9.4 fL      Platelets 350 10*3/mm3      Neutrophil % 50.2 %      Lymphocyte % 29.7 %      Monocyte % 8.4 %      Eosinophil % 10.9 (H) %      Basophil % 0.4 %      Immature Grans % 0.4 %      Neutrophils, Absolute 2.45 10*3/mm3      Lymphocytes, Absolute 1.45 10*3/mm3      Monocytes, Absolute 0.41 10*3/mm3      Eosinophils, Absolute 0.53 (H) 10*3/mm3      Basophils, Absolute 0.02 10*3/mm3      Immature Grans, Absolute 0.02  10*3/mm3     Basic Metabolic Panel [[id number]]  (Abnormal) Collected:  02/19/17 0607    Specimen:  Blood Updated:  02/19/17 0657     Glucose 61 (L) mg/dL      BUN 16 mg/dL      Creatinine 0.70 mg/dL      Sodium 140 mmol/L      Potassium 3.9 mmol/L      Chloride 113 (H) mmol/L      CO2 22.0 mmol/L      Calcium 8.4 (L) mg/dL      eGFR Non African Amer 78 mL/min/1.73      BUN/Creatinine Ratio 22.9      Anion Gap 5.0 mmol/L     Narrative:       National Kidney Foundation Guidelines    Stage                           Description                             GFR                      1                               Normal or High                          90+  2                               Mild decrease                            60-89  3                               Moderate decrease                   30-59  4                               Severe decrease                       15-29  5                               Kidney failure                             <15    Magnesium [[id number]]  (Normal) Collected:  02/19/17 0607    Specimen:  Blood Updated:  02/19/17 0657     Magnesium 2.2 mg/dL     CBC & Differential [[id number]] Collected:  02/20/17 0844    Specimen:  Blood Updated:  02/20/17 0927    Narrative:       The following orders were created for panel order CBC & Differential.  Procedure                               Abnormality         Status                     ---------                               -----------         ------                     CBC Auto Differential[[id number]]         Abnormal            Final result                 Please view results for these tests on the individual orders.    CBC Auto Differential [[id number]]  (Abnormal) Collected:  02/20/17 0844    Specimen:  Blood Updated:  02/20/17 0927     WBC 4.73 10*3/mm3      RBC 3.31 (L) 10*6/mm3      Hemoglobin 9.9 (L) g/dL      Hematocrit 30.1 (L) %      MCV 90.9 fL      MCH 29.9 pg      MCHC 32.9 g/dL      RDW 14.8 (H) %      RDW-SD 49.8 fl      MPV 9.5  fL      Platelets 385 10*3/mm3      Neutrophil % 45.9 %      Lymphocyte % 34.5 %      Monocyte % 9.7 %      Eosinophil % 9.3 (H) %      Basophil % 0.2 %      Immature Grans % 0.4 %      Neutrophils, Absolute 2.17 10*3/mm3      Lymphocytes, Absolute 1.63 10*3/mm3      Monocytes, Absolute 0.46 10*3/mm3      Eosinophils, Absolute 0.44 (H) 10*3/mm3      Basophils, Absolute 0.01 10*3/mm3      Immature Grans, Absolute 0.02 10*3/mm3     Magnesium [[id number]]  (Normal) Collected:  02/20/17 0844    Specimen:  Blood Updated:  02/20/17 0947     Magnesium 1.9 mg/dL     Basic Metabolic Panel [[id number]]  (Abnormal) Collected:  02/20/17 0844    Specimen:  Blood Updated:  02/20/17 0953     Glucose 113 (H) mg/dL      BUN 11 mg/dL      Creatinine 0.70 mg/dL      Sodium 141 mmol/L      Potassium 3.6 mmol/L      Chloride 111 (H) mmol/L      CO2 24.0 mmol/L      Calcium 8.8 mg/dL      eGFR Non African Amer 78 mL/min/1.73      BUN/Creatinine Ratio 15.7      Anion Gap 6.0 mmol/L     Narrative:       National Kidney Foundation Guidelines    Stage                           Description                             GFR                      1                               Normal or High                          90+  2                               Mild decrease                            60-89  3                               Moderate decrease                   30-59  4                               Severe decrease                       15-29  5                               Kidney failure                             <15    Blood Culture [[id number]]  (Abnormal) Collected:  02/16/17 0825    Specimen:  Blood from Arm, Right Updated:  02/21/17 0944     Blood Culture Staphylococcus, coagulase negative (A)       Susceptibility will not be done unless Doctor notifies Lab  Suggests contamination          Isolated from Aerobic Bottle      Gram Stain Result        Aerobic Bottle Gram positive cocci in clusters    Blood Culture [[id number]]  (Normal)  "Collected:  02/16/17 0805    Specimen:  Blood from Arm, Left Updated:  02/21/17 1001     Blood Culture No growth at 5 days         Imaging Results (all)     Procedure Component Value Units Date/Time    XR Chest 1 View [[id number]] Collected:  02/16/17 0924     Updated:  02/16/17 1024    Narrative:       EXAMINATION: XR CHEST 1 VW-      INDICATION: Fever.      COMPARISON: 12/29/2016.     FINDINGS: There is persistent airspace disease in the left lower lobe.  The heart is large but radiographically compensated.           Impression:       Persistent left lower lobe airspace disease.     D:  02/16/2017  E:  02/16/2017     This report was finalized on 2/16/2017 10:22 AM by Dr. Lars Washington MD.               Physical Exam on Discharge:    Visit Vitals   • /84 (BP Location: Left arm, Patient Position: Lying)   • Pulse 74   • Temp 98.1 °F (36.7 °C) (Oral)   • Resp 18   • Ht 62\" (157.5 cm)   • Wt 146 lb 2.6 oz (66.3 kg)   • SpO2 96%   • BMI 26.73 kg/m2     Gen-no acute distress, appears comfortable  HEENT-poor dentition  CV-RRR, S1 S2 normal, no m/r/g  Resp-CTAB, no wheezes  Abd-soft, NT, ND, +BS  Ext-edema and erythema improved  Neuro-A&Ox3, no focal deficits  Psych-appropriate mood      Discharge Disposition  Skilled Nursing Facility (DC - External)    Discharge Medications   Joana Dixon   Home Medication Instructions NEGRITA:[id number]    Printed on:02/23/17 0741   Medication Information                      acetaminophen (TYLENOL) 500 MG tablet  Take 500 mg by mouth Daily.             acetaminophen (TYLENOL) 650 MG suppository  Insert 650 mg into the rectum Every 4 (Four) Hours As Needed for mild pain (1-3).             amLODIPine (NORVASC) 5 MG tablet  Take 1 tablet by mouth Daily.             apixaban (ELIQUIS) 2.5 MG tablet tablet  Take 1 tablet by mouth Every 12 (Twelve) Hours.             bisacodyl (DULCOLAX) 10 MG suppository  Insert 10 mg into the rectum Daily.             castor oil-balsam peru (VENELEX) " ointment  Apply 1 application topically Every 12 (Twelve) Hours.             diclofenac (VOLTAREN) 1 % gel gel  Apply 4 g topically Daily As Needed. TO HANDS              Dimethicone (REMEDY CLEANSING BODY) 1.5 % lotion  Apply 1 application topically Daily. To perineum and buttocks             dorzolamide-timolol (COSOPT) 22.3-6.8 MG/ML ophthalmic solution  Administer 1 drop to both eyes 2 (Two) Times a Day.             ipratropium-albuterol (DUO-NEB) 0.5-2.5 mg/mL nebulizer  Take 3 mL by nebulization 4 (Four) Times a Day.             magnesium hydroxide (MILK OF MAGNESIA) 400 MG/5ML suspension  Take 30 mL by mouth Daily As Needed for constipation.             Multiple Vitamins-Minerals (MULTIVITAMIN ADULT PO)  Take  by mouth Daily.             nystatin (MYCOSTATIN) 798054 UNIT/GM powder  Apply  topically Every 12 (Twelve) Hours.             saccharomyces boulardii (FLORASTOR) 250 MG capsule  Take 1 capsule by mouth 2 (Two) Times a Day.             traMADol (ULTRAM) 50 MG tablet  Take 1 tablet by mouth Every 8 (Eight) Hours As Needed for moderate pain (4-6) for up to 3 days.             vancomycin 50 MG/ML solution oral solution  Take 2.5 mL by mouth Every 6 (Six) Hours. Indications: Clostridium Difficile Infection                 Discharge Diet: As tolerated      Activity at Discharge: As tolerated      Follow-up Appointments  No future appointments.  Additional Instructions for the Follow-ups that You Need to Schedule     Discharge Follow-up with PCP    As directed    Follow Up Details:  1 week       Discharge Follow-up with Specialty    As directed    Follow Up:  1 Month   Follow Up Details:  Dr Lamas                 Time: Discharge 40 min

## 2017-02-23 NOTE — PROGRESS NOTES
Joana Dixon  9/24/1923  [id number]  2/23/2017    CC: Severe sepsis with hypotension and hypothermia.    Joana Dixon is a 89 y.o. female here for bilateral lower extremity cellulitis, Proteus urinary tract infection, Clostridium difficile colitis, non-resolving left lower lobe pneumonia..         Past medical history:  Past Medical History   Diagnosis Date   • Anemia    • Arthritis    • Chronic kidney disease (CKD), stage III (moderate)    • Chronic pain      right hip   • Confusion    • Dehydration    • Dementia    • Glaucoma    • H/O urinary frequency    • Hypertension    • Insomnia    • Macular degeneration    • PVD (peripheral vascular disease)      wears compression stockings   • Renal disorder    • Upper respiratory infection        Medications:   Current Facility-Administered Medications:   •  acetaminophen (TYLENOL) suppository 650 mg, 650 mg, Rectal, Q4H PRN, Nj Lloyd MD  •  acetaminophen (TYLENOL) tablet 500 mg, 500 mg, Oral, Daily, Nj Lloyd MD, 500 mg at 02/22/17 0912  •  aluminum-magnesium hydroxide-simethicone (MAALOX/MYLANTA) suspension 30 mL, 30 mL, Oral, Q6H PRN, Nj Lloyd MD  •  amLODIPine (NORVASC) tablet 5 mg, 5 mg, Oral, Q24H, Zully Ferrara II, DO, 5 mg at 02/22/17 0912  •  apixaban (ELIQUIS) tablet 2.5 mg, 2.5 mg, Oral, Q12H, Nj Lloyd MD, 2.5 mg at 02/22/17 2026  •  bisacodyl (DULCOLAX) suppository 10 mg, 10 mg, Rectal, Daily, Nj Lloyd MD, 10 mg at 02/16/17 1446  •  dorzolamide-timolol (COSOPT) ophthalmic solution 1 drop, 1 drop, Both Eyes, BID, Nj Lloyd MD, 1 drop at 02/22/17 1747  •  ipratropium-albuterol (DUO-NEB) nebulizer solution 3 mL, 3 mL, Nebulization, Q4H PRN, Zully Ferrara II, DO  •  magnesium hydroxide (MILK OF MAGNESIA) suspension 2400 mg/10mL 10 mL, 10 mL, Oral, Daily PRN, Nj Lloyd MD  •  magnesium sulfate in D5W 1g/100mL (PREMIX) IVPB 1 g, 1 g, Intravenous, PRN **OR** magnesium sulfate 8 g in dextrose (D5W) 5 % 250 mL infusion, 8 g,  "Intravenous, PRN **OR** magnesium sulfate 6 g in dextrose (D5W) 5 % 250 mL infusion, 6 g, Intravenous, PRN **OR** magnesium sulfate in D5W 1g/100mL (PREMIX) IVPB 1 g, 1 g, Intravenous, PRN, Casie M Mayne, PA  •  nystatin (MYCOSTATIN) powder, , Topical, Q12H, Nj Lloyd MD  •  ondansetron (ZOFRAN) injection 4 mg, 4 mg, Intravenous, Q6H PRN, Nj Lloyd MD  •  saccharomyces boulardii (FLORASTOR) capsule 250 mg, 250 mg, Oral, BID, Edgardo Beatty MD, 250 mg at 02/22/17 0912  •  sodium chloride 0.9 % flush 1-10 mL, 1-10 mL, Intravenous, PRN, Nj Lloyd MD  •  traMADol (ULTRAM) tablet 50 mg, 50 mg, Oral, Q8H PRN, Nj Lloyd MD, 50 mg at 02/16/17 1057  •  vancomycin oral solution 125 mg, 125 mg, Oral, Q6H, Edgardo Beatty MD, 125 mg at 02/23/17 0649  Antibiotics:  IV Anti-Infectives     Ordered     Dose/Rate Route Frequency Start Stop    02/17/17 0725  vancomycin oral solution 125 mg     Ordering Provider:  Edgardo Beatty MD    125 mg Oral Every 6 Hours Scheduled 02/17/17 0800      02/16/17 0744  vancomycin (VANCOCIN) IVPB 1 g (premix) in Dextrose 5% 200 mL     Ordering Provider:  Travis Nielsen MD    20 mg/kg × 49.9 kg  over 60 Minutes Intravenous Once 02/16/17 0830 02/16/17 1002    02/16/17 0744  piperacillin-tazobactam (ZOSYN) 3.375 g in dextrose 50 mL IVPB (premix)     Ordering Provider:  Travis Nielsen MD    3.375 g  over 0.5 Hours Intravenous Once 02/16/17 0800 02/16/17 0900          Allergies:  has No Known Allergies.    Review of Systems: All other reviewed and negative except as per HPI    Blood pressure 128/84, pulse 74, temperature 98.1 °F (36.7 °C), temperature source Oral, resp. rate 18, height 62\" (157.5 cm), weight 146 lb 2.6 oz (66.3 kg), SpO2 96 %.  GENERAL: Awake and alert, in no acute distress.  Agitated.  Disoriented.  HEENT: Oropharynx without thrush. Sinuses nontender.  Severe periodontal disease with missing and carious teeth.  No cervical adenopathy. No carotid bruits/ " jugular venous distention.   EYES: PERRL. No conjunctival injection. No icterus. EOMI.  LYMPHATICS: No lymphadenopathy of the neck or axillary or inguinal regions.   HEART: No murmur, gallop, or pericardial friction rub.   LUNGS: Clear to auscultation anteriorly. No percussion dullness.  His auditory crackles in left posterior lung base.  ABDOMEN: Soft, nontender, nondistended. No appreciable HSM.    SKIN: Warm and dry without cutaneous eruptions. No embolic stigmata.  Cellulitis on bilateral anterior tibial surfaces resolved.  PSYCHIATRIC: Mental status confused. Cranial nerve function intact.       DIAGNOSTICS:  Lab Results   Component Value Date    WBC 4.73 02/20/2017    HGB 9.9 (L) 02/20/2017    HCT 30.1 (L) 02/20/2017     02/20/2017     No results found for: CRP  No results found for: SEDRATE  Lab Results   Component Value Date    GLUCOSE 113 (H) 02/20/2017    BUN 11 02/20/2017    CREATININE 0.70 02/20/2017    EGFRIFNONA 78 02/20/2017    BCR 15.7 02/20/2017    CO2 24.0 02/20/2017    CALCIUM 8.8 02/20/2017    ALBUMIN 3.60 02/16/2017    LABIL2 1.1 (L) 02/16/2017    AST 17 02/16/2017    ALT 10 02/16/2017       Microbiology: C. difficile toxin assay positive.  Urine with 10 to the fifth colony-forming units of Proteus.      RADIOLOGY:  Imaging Results (last 72 hours)     ** No results found for the last 72 hours. **          Assessment and Plan: Severe sepsis.  Gram-negative urinary tract infection.  Bilateral lower extremity cellulitis.  Coagulase-negative staphylococcal bacteremia one of 2 admission blood cultures/likely cutaneous contaminant.  Alzheimer's type dementia.  Persistent left lower lobe pulmonary infiltrate/acute versus scar.  Maximum temperature over 24 hours 98.1°.  Peripheral leukocyte count 4700.  No new culture data.  Utilization management: The patient apparently has a private room available today at [facility].  Again I would recommend vancomycin taper over the next 4 weeks  with concomitant probiotic therapy.      Edgardo Beatty MD  2/23/2017

## 2017-02-23 NOTE — PLAN OF CARE
Problem: Patient Care Overview (Adult)  Goal: Plan of Care Review  Outcome: Ongoing (interventions implemented as appropriate)    02/23/17 0708   Coping/Psychosocial Response Interventions   Plan Of Care Reviewed With patient   Patient Care Overview   Progress progress toward functional goals as expected   Outcome Evaluation   Outcome Summary/Follow up Plan VSS stable, patient going to [facility] today         Problem: Sepsis (Adult)  Goal: Signs and Symptoms of Listed Potential Problems Will be Absent or Manageable (Sepsis)  Outcome: Ongoing (interventions implemented as appropriate)    Problem: Pressure Ulcer (Adult)  Goal: Signs and Symptoms of Listed Potential Problems Will be Absent or Manageable (Pressure Ulcer)  Outcome: Ongoing (interventions implemented as appropriate)    Problem: Fall Risk (Adult)  Goal: Identify Related Risk Factors and Signs and Symptoms  Outcome: Ongoing (interventions implemented as appropriate)  Goal: Absence of Falls  Outcome: Ongoing (interventions implemented as appropriate)

## 2017-02-28 NOTE — PROGRESS NOTES
Continued Stay Note  [facility]     Patient Name: Joana Dixon  MRN: [id number]  Today's Date: 2/28/2017    Admit Date: 2/16/2017          Discharge Plan     Pt's daughter, Nona Lu, consented to Ms. Dixon's participation in the [facility]. Rylee Perera RN                Discharge Codes     None        Expected Discharge Date and Time     Expected Discharge Date Expected Discharge Time    Feb 23, 2017             Rylee Perera RN

## 2020-12-28 NOTE — PROGRESS NOTES
"Adult Nutrition  Assessment/PES    Patient Name:  Joana Dixon  YOB: 1923  MRN: [id number]  Admit Date:  2/16/2017    Assessment Date:  2/17/2017        Reason for Assessment       02/17/17 1451    Reason for Assessment    Identified At Risk By Screening Criteria large or nonhealing wound, burn or pressure ulcer    Time Spent (min) 30    Infectious Disease Sepsis;UTI   POA    Neurological Dementia   agitation    Skin Pressure ulcer   right heel unstageable pressure injury POA              Nutrition/Diet History       02/17/17 1457    Nutrition/Diet History    Typical Food/Fluid Intake Talked with pt.'s dtr., Nona Lu. She stated boost plus has been her main source of nutrition. She requested specific foods that she thinks her Mother can eat. She stated her mother dislikes ground meat and cottage cheese. She asked why her mother is on a cardiac diet as she does not have HTN.            Anthropometrics       02/17/17 1453    Anthropometrics    Height 157.5 cm (62\")    Weight 66.3 kg (146 lb 3.2 oz)    Ideal Body Weight (IBW)    Ideal Body Weight (IBW), Female 50.83    % Ideal Body Weight 130.73    Usual Body Weight (UBW)    Usual Body Weight 64.9 kg (143 lb)   Per admission on 12/30/2016    % Usual Body Weight 102.24    Body Mass Index (BMI)    BMI (kg/m2) 26.8            Labs/Tests/Procedures/Meds       02/17/17 1454    Labs/Tests/Procedures/Meds    Labs/Tests Review Reviewed;C-Diff                Nutrition Prescription Ordered       02/17/17 1455    Nutrition Prescription PO    Current PO Diet Soft Texture    Texture Ground    Supplement Boost Plus    Supplement Frequency 3 times a day            Evaluation of Received Nutrient/Fluid Intake       02/17/17 1456    PO Evaluation    Number of Meals 2    % PO Intake 0              Problem/Interventions:        Problem 1       02/17/17 1500    Nutrition Diagnoses Problem 1    Problem 1 Inadequate Intake/Infusion    Etiology (related to) --   clinical " Spoke to pt on cell # [phone] pt stated he will need to check his work schedule and call back patient has 's number [phone] no need for reminder letter. Pt plans to do covid test on Friday quarantine until Monday. Pt to have colonoscopy on Monday at [facility].    condition    Signs/Symptoms (evidenced by) Report of Mnimal PO Intake                    Intervention Goal       02/17/17 1500    Intervention Goal    General Nutrition support treatment    PO Establish PO            Nutrition Intervention       02/17/17 1500    Nutrition Intervention    RD/Tech Action Advise alternate selection;Interview for preference;Menu provided;Supplement provided;Follow Tx progress   All discussed with pt.'s dtr., Nona Lu via phone. She asked for a akash. boost plus now. [facility] and nutrition's plan of care.              Education/Evaluation       02/17/17 1511    Education    Education --   MNT reviewed with pt.'s dtr.    Monitor/Evaluation    Monitor Per protocol            Electronically signed by:  Leona Carranza RD  02/17/17 3:11 PM

## 2021-07-13 NOTE — PLAN OF CARE
Problem: Patient Care Overview (Adult)  Goal: Plan of Care Review  Outcome: Ongoing (interventions implemented as appropriate)    02/22/17 1631   Coping/Psychosocial Response Interventions   Plan Of Care Reviewed With patient   Patient Care Overview   Progress progress toward functional goals as expected   Outcome Evaluation   Outcome Summary/Follow up Plan PT VSS, Bp slightly elevated, complaining of less pain today, possible d/c to NH tomorrow         Problem: Sepsis (Adult)  Goal: Signs and Symptoms of Listed Potential Problems Will be Absent or Manageable (Sepsis)  Outcome: Ongoing (interventions implemented as appropriate)    Problem: Pressure Ulcer (Adult)  Goal: Signs and Symptoms of Listed Potential Problems Will be Absent or Manageable (Pressure Ulcer)  Outcome: Ongoing (interventions implemented as appropriate)    Problem: Skin Integrity Impairment, Risk/Actual (Adult)  Goal: Identify Related Risk Factors and Signs and Symptoms  Outcome: Ongoing (interventions implemented as appropriate)  Goal: Skin Integrity/Wound Healing  Outcome: Ongoing (interventions implemented as appropriate)       Finasteride Pregnancy And Lactation Text: This medication is absolutely contraindicated during pregnancy. It is unknown if it is excreted in breast milk.